# Patient Record
Sex: FEMALE | Race: WHITE | Employment: UNEMPLOYED | URBAN - METROPOLITAN AREA
[De-identification: names, ages, dates, MRNs, and addresses within clinical notes are randomized per-mention and may not be internally consistent; named-entity substitution may affect disease eponyms.]

---

## 2017-08-22 ENCOUNTER — HOSPITAL ENCOUNTER (OUTPATIENT)
Dept: OTHER | Age: 52
Discharge: HOME | End: 2017-08-22
Attending: INTERNAL MEDICINE | Admitting: INTERNAL MEDICINE

## 2017-08-22 ENCOUNTER — OFFICE VISIT (OUTPATIENT)
Dept: PULMONOLOGY | Age: 52
End: 2017-08-22

## 2017-08-22 VITALS
OXYGEN SATURATION: 97 % | BODY MASS INDEX: 32.58 KG/M2 | HEART RATE: 76 BPM | WEIGHT: 190.8 LBS | SYSTOLIC BLOOD PRESSURE: 104 MMHG | HEIGHT: 64 IN | TEMPERATURE: 98.1 F | DIASTOLIC BLOOD PRESSURE: 64 MMHG | RESPIRATION RATE: 16 BRPM

## 2017-08-22 DIAGNOSIS — R05.9 COUGH: ICD-10-CM

## 2017-08-22 DIAGNOSIS — R06.02 SOB (SHORTNESS OF BREATH): Primary | ICD-10-CM

## 2017-08-22 DIAGNOSIS — Z23 NEED FOR INFLUENZA VACCINATION: ICD-10-CM

## 2017-08-22 DIAGNOSIS — Z72.0 TOBACCO ABUSE: ICD-10-CM

## 2017-08-22 DIAGNOSIS — R06.02 SOB (SHORTNESS OF BREATH): ICD-10-CM

## 2017-08-22 DIAGNOSIS — Z23 NEED FOR PNEUMOCOCCAL VACCINATION: ICD-10-CM

## 2017-08-22 PROCEDURE — 90472 IMMUNIZATION ADMIN EACH ADD: CPT | Performed by: INTERNAL MEDICINE

## 2017-08-22 PROCEDURE — 90732 PPSV23 VACC 2 YRS+ SUBQ/IM: CPT | Performed by: INTERNAL MEDICINE

## 2017-08-22 PROCEDURE — 90688 IIV4 VACCINE SPLT 0.5 ML IM: CPT | Performed by: INTERNAL MEDICINE

## 2017-08-22 PROCEDURE — 90471 IMMUNIZATION ADMIN: CPT | Performed by: INTERNAL MEDICINE

## 2017-08-22 PROCEDURE — 99244 OFF/OP CNSLTJ NEW/EST MOD 40: CPT | Performed by: INTERNAL MEDICINE

## 2017-08-22 RX ORDER — LORATADINE 10 MG/1
10 TABLET ORAL DAILY
Refills: 1 | COMMUNITY
Start: 2017-07-24 | End: 2022-07-26

## 2017-08-22 RX ORDER — ALBUTEROL SULFATE 2.5 MG/3ML
SOLUTION RESPIRATORY (INHALATION)
COMMUNITY
Start: 2017-07-27

## 2017-08-22 RX ORDER — CITALOPRAM 20 MG/1
TABLET ORAL DAILY
COMMUNITY
Start: 2017-08-03 | End: 2022-07-26

## 2017-08-22 RX ORDER — ROPINIROLE 0.5 MG/1
0.5 TABLET, FILM COATED ORAL NIGHTLY
COMMUNITY
Start: 2017-08-03

## 2017-08-22 RX ORDER — FLUTICASONE PROPIONATE 50 MCG
1 SPRAY, SUSPENSION (ML) NASAL DAILY
Qty: 1 BOTTLE | Refills: 3 | Status: SHIPPED | OUTPATIENT
Start: 2017-08-22

## 2017-08-22 RX ORDER — CLONAZEPAM 0.5 MG/1
0.5 TABLET ORAL NIGHTLY
COMMUNITY
Start: 2017-08-03

## 2017-08-22 RX ORDER — FUROSEMIDE 20 MG/1
20 TABLET ORAL DAILY
COMMUNITY
Start: 2017-08-03

## 2017-08-22 RX ORDER — PRAVASTATIN SODIUM 40 MG
40 TABLET ORAL NIGHTLY
COMMUNITY
Start: 2017-08-21

## 2017-08-22 RX ORDER — QUETIAPINE FUMARATE 100 MG/1
100 TABLET, FILM COATED ORAL 2 TIMES DAILY
COMMUNITY
Start: 2017-07-27

## 2017-08-22 RX ORDER — IBUPROFEN 800 MG/1
TABLET ORAL
Status: ON HOLD | COMMUNITY
Start: 2017-08-21 | End: 2018-05-16 | Stop reason: HOSPADM

## 2017-08-22 RX ORDER — BACLOFEN 20 MG/1
20 TABLET ORAL 2 TIMES DAILY
Refills: 5 | COMMUNITY
Start: 2017-06-01

## 2017-08-22 RX ORDER — TRAMADOL HYDROCHLORIDE 50 MG/1
TABLET ORAL
COMMUNITY
Start: 2017-08-03 | End: 2017-10-30 | Stop reason: HOSPADM

## 2017-08-22 RX ORDER — OMEPRAZOLE 40 MG/1
40 CAPSULE, DELAYED RELEASE ORAL DAILY
COMMUNITY
Start: 2017-08-21

## 2017-08-22 RX ORDER — GABAPENTIN 600 MG/1
600 TABLET ORAL 2 TIMES DAILY
Refills: 0 | COMMUNITY
Start: 2017-07-24

## 2017-08-22 RX ORDER — MELOXICAM 15 MG/1
TABLET ORAL
COMMUNITY
Start: 2017-08-03 | End: 2017-10-26 | Stop reason: ALTCHOICE

## 2017-08-22 RX ORDER — METHOCARBAMOL 750 MG/1
TABLET, FILM COATED ORAL
COMMUNITY
Start: 2017-08-21 | End: 2018-05-03

## 2017-08-22 RX ORDER — POTASSIUM CHLORIDE 750 MG/1
10 TABLET, EXTENDED RELEASE ORAL DAILY
Refills: 1 | COMMUNITY
Start: 2017-06-22

## 2017-08-22 RX ORDER — BUDESONIDE AND FORMOTEROL FUMARATE DIHYDRATE 160; 4.5 UG/1; UG/1
AEROSOL RESPIRATORY (INHALATION)
COMMUNITY
Start: 2017-08-21 | End: 2018-05-03

## 2017-08-22 RX ORDER — VENLAFAXINE HYDROCHLORIDE 75 MG/1
CAPSULE, EXTENDED RELEASE ORAL DAILY
Refills: 5 | COMMUNITY
Start: 2017-06-08

## 2017-09-14 ENCOUNTER — HOSPITAL ENCOUNTER (OUTPATIENT)
Dept: PULMONOLOGY | Age: 52
Discharge: HOME | End: 2017-09-14
Attending: INTERNAL MEDICINE | Admitting: INTERNAL MEDICINE

## 2017-09-14 ENCOUNTER — OFFICE VISIT (OUTPATIENT)
Dept: PULMONOLOGY | Age: 52
End: 2017-09-14

## 2017-09-14 VITALS
OXYGEN SATURATION: 96 % | HEART RATE: 73 BPM | HEIGHT: 64 IN | WEIGHT: 186 LBS | BODY MASS INDEX: 31.76 KG/M2 | DIASTOLIC BLOOD PRESSURE: 72 MMHG | SYSTOLIC BLOOD PRESSURE: 106 MMHG | TEMPERATURE: 97.7 F | RESPIRATION RATE: 16 BRPM

## 2017-09-14 DIAGNOSIS — R06.02 SOB (SHORTNESS OF BREATH): ICD-10-CM

## 2017-09-14 DIAGNOSIS — Z72.0 TOBACCO ABUSE: ICD-10-CM

## 2017-09-14 DIAGNOSIS — R07.89 CHEST PRESSURE: Primary | ICD-10-CM

## 2017-09-14 DIAGNOSIS — R06.02 SHORTNESS OF BREATH: ICD-10-CM

## 2017-09-14 DIAGNOSIS — R05.9 COUGH: ICD-10-CM

## 2017-09-14 PROCEDURE — 99214 OFFICE O/P EST MOD 30 MIN: CPT | Performed by: INTERNAL MEDICINE

## 2017-09-14 RX ORDER — ALBUTEROL SULFATE 2.5 MG/3ML
2.5 SOLUTION RESPIRATORY (INHALATION) ONCE
Status: COMPLETED | OUTPATIENT
Start: 2017-09-14 | End: 2017-09-14

## 2017-09-14 RX ADMIN — ALBUTEROL SULFATE 2.5 MG: 2.5 SOLUTION RESPIRATORY (INHALATION) at 10:39

## 2017-09-21 ENCOUNTER — OFFICE VISIT (OUTPATIENT)
Dept: ORTHOPEDIC SURGERY | Age: 52
End: 2017-09-21

## 2017-09-21 VITALS — HEIGHT: 64 IN | WEIGHT: 186.07 LBS | BODY MASS INDEX: 31.77 KG/M2

## 2017-09-21 DIAGNOSIS — M23.204 DEGENERATIVE TEAR OF MEDIAL MENISCUS OF LEFT KNEE: ICD-10-CM

## 2017-09-21 DIAGNOSIS — G89.29 CHRONIC PAIN OF LEFT KNEE: ICD-10-CM

## 2017-09-21 DIAGNOSIS — M17.12 PRIMARY OSTEOARTHRITIS OF LEFT KNEE: Primary | ICD-10-CM

## 2017-09-21 DIAGNOSIS — M25.562 CHRONIC PAIN OF LEFT KNEE: ICD-10-CM

## 2017-09-21 PROCEDURE — 99243 OFF/OP CNSLTJ NEW/EST LOW 30: CPT | Performed by: ORTHOPAEDIC SURGERY

## 2017-10-11 ENCOUNTER — OFFICE VISIT (OUTPATIENT)
Dept: ORTHOPEDIC SURGERY | Age: 52
End: 2017-10-11

## 2017-10-11 DIAGNOSIS — M23.204 DEGENERATIVE TEAR OF MEDIAL MENISCUS OF LEFT KNEE: ICD-10-CM

## 2017-10-11 DIAGNOSIS — M25.562 CHRONIC PAIN OF LEFT KNEE: ICD-10-CM

## 2017-10-11 DIAGNOSIS — G89.29 CHRONIC PAIN OF LEFT KNEE: ICD-10-CM

## 2017-10-11 DIAGNOSIS — M17.12 PRIMARY OSTEOARTHRITIS OF LEFT KNEE: Primary | ICD-10-CM

## 2017-10-11 DIAGNOSIS — M25.551 PAIN OF RIGHT HIP JOINT: ICD-10-CM

## 2017-10-11 PROCEDURE — 99213 OFFICE O/P EST LOW 20 MIN: CPT | Performed by: ORTHOPAEDIC SURGERY

## 2017-10-11 PROCEDURE — 73502 X-RAY EXAM HIP UNI 2-3 VIEWS: CPT | Performed by: ORTHOPAEDIC SURGERY

## 2017-10-11 NOTE — PROGRESS NOTES
KNEE VISIT      HISTORY OF PRESENT ILLNESS    Abhilash Hairston is a 45 y.o. female who presents for reevaluation of her left knee after her MRI. She additionally fell recently landing on her right buttock and now has some posterior hip pain. She did not have any radicular symptoms in the right side. She does note persistent having discomfort in the left knee. She says is closer to fall several times. ROS    Well-documented in the patient history form dated 9/21/17  All other ROS negative except for above. Past Surgical history    History reviewed. No pertinent surgical history.     PAST MEDICAL    Past Medical History:   Diagnosis Date    COPD (chronic obstructive pulmonary disease) (HCC)     GERD (gastroesophageal reflux disease)     Hiatal hernia    Hyperlipidemia     Primary osteoarthritis of left knee 9/21/2017    Tobacco abuse 8/22/2017    Type 2 diabetes mellitus without complication (HCC)        Allergies    No Known Allergies    Meds    Current Outpatient Prescriptions   Medication Sig Dispense Refill    PROAIR  (90 Base) MCG/ACT inhaler Inhale 2 puffs into the lungs every 6 hours as needed for Wheezing or Shortness of Breath 1 Inhaler 5    baclofen (LIORESAL) 20 MG tablet   5    omeprazole (PRILOSEC) 40 MG delayed release capsule       QUEtiapine (SEROQUEL) 100 MG tablet       citalopram (CELEXA) 20 MG tablet       meloxicam (MOBIC) 15 MG tablet       methocarbamol (ROBAXIN) 750 MG tablet       gabapentin (NEURONTIN) 600 MG tablet   0    albuterol (PROVENTIL) (2.5 MG/3ML) 0.083% nebulizer solution       clonazePAM (KLONOPIN) 0.5 MG tablet       SYMBICORT 160-4.5 MCG/ACT AERO       furosemide (LASIX) 20 MG tablet       ibuprofen (ADVIL;MOTRIN) 800 MG tablet       loratadine (CLARITIN) 10 MG tablet   1    potassium chloride (KLOR-CON M) 10 MEQ extended release tablet   1    pravastatin (PRAVACHOL) 40 MG tablet       rOPINIRole (REQUIP) 0.5 MG tablet       traMADol tenderness [x] [] [x] []     Additional Exam Comments:  Her neurocirculatory and lymphatic exam appears stable symmetric and relatively normal to both lower extremities. She does have generalized pain and some crepitus in the left knee with stress and range of motion and deep palpation along the joint line, particularly along the medial joint line. She has no gross instability. She has some pain around posterior aspect right hip. No swelling, crepitus, or bruising. She can walk well. Neurologically she is intact and symmetric to both lower extremities. Her gait is somewhat abnormal.      IMAGING STUDIES    MRI of her left knee reveals an insufficiency fracture, lateral tibial plateau as well as lateral meniscus tear. It looks like she probably also has a posterior 3rd medial meniscus tear with Baker cyst.      X-rays 2 views of the right hip are unremarkable for acute or chronic pathology    IMPRESSION    Left knee pain, chronic, secondary to osteoarthritis and degenerative medial meniscus and lateral meniscus tears with insufficiency fracture of the lateral tibial plateau  Right acute hip pain secondary to contusion    PLAN      1. Conservative care options including physical therapy, NSAIDs, bracing, and activity modification were discussed. 2.  The indications for therapeutic injections were discussed. 3.  The indications for additional imaging studies were discussed. 4.  After considering the various options discussed, the patient elected to pursue a course that includes proceed with arthroscopic intervention to her left knee to include internal fixation of her insufficiency fracture with bone substitute. INFORMED CONSENT NOTE        We discussed the risks, benefits, and alternatives to the proposed procedure, as well as the necessity of other members of the healthcare team participating in the procedure.  All questions were answered and the patient elected to proceed with the proposed procedure and signed the informed consent form.

## 2017-10-12 PROBLEM — M23.301 DEGENERATIVE TEAR OF LATERAL MENISCUS OF LEFT KNEE: Status: ACTIVE | Noted: 2017-10-12

## 2017-10-13 NOTE — LETTER
Meet Nogueira. Marramandwight Gallardojosé antonio        Patient:  Mirian Frazier             Surgery Location:    [facility]        Surgery Time & Date:   10/3017  @ 11:00 am    ARRIVE at the surgery center by:  9:00 am        INSTRUCTIONS FOR SURGERY    You must have medical clearance before your surgery. Call your family physician to schedule an appointment for a physical 2-3 weeks before your surgery and take the attached history and physical form to your family physician. It will need to be completed by your family physician and faxed to the appropriate number. If not completed your surgery will be cancelled. Physicals are only good for 30 days. You may require some pre-surgical lab work. Check with your family physician. A nurse from the hospital will call you to discuss your medical history. If you see a cardiologist you must have cardiac clearance before your surgery. Please make an appointment with your cardiologist for clearance. ? You MAY NOT eat or drink anything after midnight the evening before you surgery. This includes chewing gum and all beverages, including coffee. ? Arrange for someone to drive you home after surgery. ? YOU WILL NOT BE PERMITTED TO DRIVE AFTER YOUR SURGERY. Stop taking any blood thinners such as Aspirin, Warfarin, Coumadin, Plavix or Ibuprofen seven days prior to surgery. If you are taking other prescription medications please call the prescribing physician. ? Do not smoke or use any tobacco products after midnight the night before your surgery. ? Do not drink alcohol for 24 hours before or after your surgery    ? You may brush your teeth or wash your mouth provided nothing is swallowed. ? All jewelry should be removed prior to surgery. It is best to leave all jewelry/valuables at home. ?  Choose loose comfortable clothing which is easy to put on, take off and that can be easily folded. Sleeves and pant legs should be loose enough to fit over bandages if necessary. ? Remove all make-up and your contact lenses, if you wear them. ? If applicable bring a protective case for contact lenses/eyeglasses, dentures and hearing aids. Insurance Information:     ? Our precert department will get authorization for your surgery if one is required. ? Please make sure we are notified of any insurance changes prior to your surgery. ? It is YOUR responsibility to know what your benefits are and whether a co-pay is required. It is recommended that you verify your benefits with your insurance company prior to surgery. Frankie Mcgee           YOUR APPOINTMENT FOLLOWING SURGERY WILL BE:                             [facility] OFFICE        _XX____KISHORE            FOR     11/8/17  @ 1:40 AM                                      PHYSICAL THERAPY APPT:_________Kishore Norris____    *If you have any questions please call Christiano Issa [phone]

## 2017-10-13 NOTE — LETTER
[facility]  Surgery Precert & Billing Form:    DEMOGRAPHICS:                                                                                                       Patient Name:  Anil Wood  Patient :  1965   Patient SS#:      Patient Phone:  [phone] (home)  Alt. Patient Phone:    Patient Address:  [address] [place] [address]    PCP:  Nicolle Espinal MD  Insurance: [employer]    DIAGNOSIS & PROCEDURE:                                                                                      Diagnosis: M84.469G, M23.301  Operation: left    SURGERY  INFORMATION  Date of Surgery:   10/30/17  Location:    [facility]  Type:    Outpatient  23 hour hold:  No  Surgeon:       Deb Andino.  Zee Reyes MD   10/13/17     BILLING INFORMATION:                                                                                                Physician Procedure                                            CPT Codes                      PA, or Fellow Procedure                                      CPT Codes                      Precert information:    SX 10/30/17 [facility]  Auth # [id number]

## 2017-10-13 NOTE — LETTER
Surgery Date: 10/30/17  Surgery Time: 11:00 AM Arrival Time: 9:00 AM      Sean Guillen Surgery Scheduling    (Fax to [phone])    1. Patient Name: Rustam Combs    2. Patient Type:  Outpatient  Admit after    Impatient       Post-procedure    3. Surgeon: Eloy Rivera. Lit Kirkland MD      Assisting MD:     4. Procedure: LT KNEE VA LATERAL MENISECTOMY CHONDROPLASTY, INTERNAL FIXATION LATERAL TIBIAL PLATEAU INSUFFICIENCY FX W/BONE SUBSTITUTE      5. LATEX ALLERGY:  YES  NO   Previous Surgery Date (if applicable):     6. Anesthesia Type:  General/BLOCK Spinal/Block    Epidural  MAC  Local             None     7. Amount of time requested for procedure: 45 MIN   (Actual time may differ according to surgeon's average time in computer)      8. Equipment Needs:  C-Arm    Laser     Ultra Sound    Phaco   Microscope   Ureteroscope   Needle Loc   Other:   KNEE IMMOBILIZER      9. Special Needs for the case: [facility]           Name of company/rep to obtain special needs: [address] [place] [address]    10. Pre-Op Diagnosis: LT KNEE LMT, OA INSUFFICIENCY FX LATERAL TIBIAL PLATEAU    11. Any special admission testing other than routine:     12. Social Security Number:                             YOB: 1965    13. Address: [place] [address]    14. Phone Number: [phone] (home)          * Alternate person to contact if unable to reach Patient *   _______________________________________  [address]: Payor: [facility] / Plan: Yessi Noel / Product Type: *No Product type* /       Scheduled By: Diane Winters 10/13/2017              Hospital Use Only:  Information entered on: _____________________ By: ______________________________________            Aly Mead TO [facility]  [phone]  IN ACCORDANCE WITH OUR FORMULARY SYSTEM, A GENERIC EQUIVALENT DRUG MAY BE DISPENSED AND ADMINISTERED UNLESS \"D. A. W. \" IS WRITTEN WIT HTHE MEDICATION ORDER

## 2017-10-23 ENCOUNTER — TELEPHONE (OUTPATIENT)
Dept: ORTHOPEDIC SURGERY | Age: 52
End: 2017-10-23

## 2017-10-23 NOTE — TELEPHONE ENCOUNTER
CPT [id number], [id number], [id number], [id number] [facility] # [id number]    Date 10-30-17   knee

## 2017-10-26 DIAGNOSIS — M17.12 OSTEOARTHRITIS OF LEFT KNEE, UNSPECIFIED OSTEOARTHRITIS TYPE: Primary | ICD-10-CM

## 2017-10-26 NOTE — PRE-PROCEDURE INSTRUCTIONS
you have a Living Will and Durable Power of  for Healthcare, please bring in a copy. 13.  Notify your Surgeon if you develop any illness between now and surgery time; cough, cold, fever, sore throat, nausea, vomiting, etc.  Please notify your surgeon if you experience dizziness, shortness of breath or blurred vision between now and the time of your surgery. 16.  DO NOT shave your operative site 96 hours (4 days) prior to surgery. For face and neck surgery, men may use an electric razor 48 hours (2 days) prior to surgery. 17. Shower the night before surgery and the morning of surgery with  an antibacterial soap   or  Chlorhexidine gluconate (for total joint replacement). To provide excellent care, visitors will be limited to two in a room at any given time. Please no children under the age of 15 in the surgical department.

## 2017-10-27 NOTE — ANESTHESIA PRE-OP
'light-for-dates' infant with signs of fetal malnutrition P05.00       Past Medical History:        Diagnosis Date    COPD (chronic obstructive pulmonary disease) ([facility] [address]     GERD (gastroesophageal reflux disease)     Hiatal hernia    Hyperlipidemia     Primary osteoarthritis of left knee 9/21/2017    Tobacco abuse 8/22/2017    Type 2 diabetes mellitus without complication ([facility] [address]        Past Surgical History:        Procedure Laterality Date    KNEE ARTHROSCOPY Left 2013       Social History:    Social History   Substance Use Topics    Smoking status: Current Every Day Smoker     Packs/day: 0.50     Years: 5.00     Types: Cigarettes    Smokeless tobacco: Never Used      Comment: down from 2ppd    Alcohol use No                                Ready to quit: Not Answered  Counseling given: Not Answered      Vital Signs (Current):   Vitals:    10/26/17 1048   Weight: 160 lb (72.6 kg)   Height: 5' 4\" (1.626 m)                                              BP Readings from Last 3 Encounters:   09/14/17 106/72   08/22/17 104/64       NPO Status:                                                                                 BMI:   Wt Readings from Last 3 Encounters:   10/26/17 160 lb (72.6 kg)   09/21/17 186 lb 1.1 oz (84.4 kg)   09/14/17 186 lb (84.4 kg)     Body mass index is 27.46 kg/m².     Anesthesia Evaluation  Patient summary reviewed and Nursing notes reviewed no history of anesthetic complications:   Airway: Mallampati: II  TM distance: >3 FB   Neck ROM: full  Mouth opening: > = 3 FB Dental:          Pulmonary:   (+) COPD:                             Cardiovascular:    (+) hyperlipidemia                  Neuro/Psych:   {neg ROS              GI/Hepatic/Renal:   (+) GERD:,           Endo/Other:    (+) Type II DM, ,                  Abdominal:           Vascular:                                      Anesthesia Plan      general     ASA 1    (45year-old patient presents for left knee arthroscopy, chondroplasty, loose body removal, and internal fixation of medial tibial plateau  insufficiency fracture with bone substitute. Plan general anesthesia with ASA standard monitors plus femoral and/or sciatic nerve blocks for postoperative analgesia. Questions answered.   Patient agreeable with anesthetic plan.  )                      Huma Lamas MD   10/27/2017

## 2017-10-30 ENCOUNTER — HOSPITAL ENCOUNTER (OUTPATIENT)
Dept: SURGERY | Age: 52
Discharge: HOME | End: 2017-10-30
Attending: ORTHOPAEDIC SURGERY | Admitting: ORTHOPAEDIC SURGERY

## 2017-10-30 VITALS
DIASTOLIC BLOOD PRESSURE: 63 MMHG | SYSTOLIC BLOOD PRESSURE: 95 MMHG | WEIGHT: 160 LBS | HEIGHT: 64 IN | OXYGEN SATURATION: 95 % | BODY MASS INDEX: 27.31 KG/M2 | TEMPERATURE: 97 F | RESPIRATION RATE: 13 BRPM | HEART RATE: 82 BPM

## 2017-10-30 DIAGNOSIS — M25.569 KNEE PAIN, UNSPECIFIED CHRONICITY, UNSPECIFIED LATERALITY: ICD-10-CM

## 2017-10-30 LAB
ANION GAP SERPL CALCULATED.3IONS-SCNC: 13 MMOL/L (ref 3–16)
BUN BLDV-MCNC: 10 MG/DL (ref 7–20)
CALCIUM SERPL-MCNC: 9.5 MG/DL (ref 8.3–10.6)
CHLORIDE BLD-SCNC: 99 MMOL/L (ref 99–110)
CO2: 28 MMOL/L (ref 21–32)
CREAT SERPL-MCNC: 0.6 MG/DL (ref 0.6–1.1)
GFR AFRICAN AMERICAN: >60
GFR NON-AFRICAN AMERICAN: >60
GLUCOSE BLD-MCNC: 106 MG/DL (ref 70–99)
GLUCOSE BLD-MCNC: 99 MG/DL (ref 70–99)
PERFORMED ON: ABNORMAL
POTASSIUM SERPL-SCNC: 3.9 MMOL/L (ref 3.5–5.1)
SODIUM BLD-SCNC: 140 MMOL/L (ref 136–145)

## 2017-10-30 PROCEDURE — 93010 ELECTROCARDIOGRAM REPORT: CPT | Performed by: INTERNAL MEDICINE

## 2017-10-30 RX ORDER — LIDOCAINE HYDROCHLORIDE 10 MG/ML
0.3 INJECTION, SOLUTION EPIDURAL; INFILTRATION; INTRACAUDAL; PERINEURAL
Status: COMPLETED | OUTPATIENT
Start: 2017-10-30 | End: 2017-10-30

## 2017-10-30 RX ORDER — ACETAMINOPHEN 10 MG/ML
1000 INJECTION, SOLUTION INTRAVENOUS ONCE
Status: COMPLETED | OUTPATIENT
Start: 2017-10-30 | End: 2017-10-30

## 2017-10-30 RX ORDER — MORPHINE SULFATE 10 MG/ML
1 INJECTION, SOLUTION INTRAMUSCULAR; INTRAVENOUS EVERY 5 MIN PRN
Status: DISCONTINUED | OUTPATIENT
Start: 2017-10-30 | End: 2017-10-31 | Stop reason: HOSPADM

## 2017-10-30 RX ORDER — SODIUM CHLORIDE 0.9 % (FLUSH) 0.9 %
10 SYRINGE (ML) INJECTION PRN
Status: DISCONTINUED | OUTPATIENT
Start: 2017-10-30 | End: 2017-10-31 | Stop reason: HOSPADM

## 2017-10-30 RX ORDER — LABETALOL HYDROCHLORIDE 5 MG/ML
5 INJECTION, SOLUTION INTRAVENOUS EVERY 10 MIN PRN
Status: DISCONTINUED | OUTPATIENT
Start: 2017-10-30 | End: 2017-10-31 | Stop reason: HOSPADM

## 2017-10-30 RX ORDER — DIPHENHYDRAMINE HYDROCHLORIDE 50 MG/ML
12.5 INJECTION INTRAMUSCULAR; INTRAVENOUS
Status: ACTIVE | OUTPATIENT
Start: 2017-10-30 | End: 2017-10-30

## 2017-10-30 RX ORDER — SODIUM CHLORIDE, SODIUM LACTATE, POTASSIUM CHLORIDE, CALCIUM CHLORIDE 600; 310; 30; 20 MG/100ML; MG/100ML; MG/100ML; MG/100ML
INJECTION, SOLUTION INTRAVENOUS CONTINUOUS
Status: DISCONTINUED | OUTPATIENT
Start: 2017-10-30 | End: 2017-10-31 | Stop reason: HOSPADM

## 2017-10-30 RX ORDER — OXYCODONE HYDROCHLORIDE 5 MG/1
5 TABLET ORAL PRN
Status: COMPLETED | OUTPATIENT
Start: 2017-10-30 | End: 2017-10-30

## 2017-10-30 RX ORDER — PROMETHAZINE HYDROCHLORIDE 25 MG/ML
6.25 INJECTION, SOLUTION INTRAMUSCULAR; INTRAVENOUS
Status: COMPLETED | OUTPATIENT
Start: 2017-10-30 | End: 2017-10-30

## 2017-10-30 RX ORDER — HYDRALAZINE HYDROCHLORIDE 20 MG/ML
5 INJECTION INTRAMUSCULAR; INTRAVENOUS EVERY 10 MIN PRN
Status: DISCONTINUED | OUTPATIENT
Start: 2017-10-30 | End: 2017-10-31 | Stop reason: HOSPADM

## 2017-10-30 RX ORDER — MEPERIDINE HYDROCHLORIDE 25 MG/ML
12.5 INJECTION INTRAMUSCULAR; INTRAVENOUS; SUBCUTANEOUS EVERY 5 MIN PRN
Status: DISCONTINUED | OUTPATIENT
Start: 2017-10-30 | End: 2017-10-31 | Stop reason: HOSPADM

## 2017-10-30 RX ORDER — OXYCODONE HYDROCHLORIDE 5 MG/1
10 TABLET ORAL PRN
Status: COMPLETED | OUTPATIENT
Start: 2017-10-30 | End: 2017-10-30

## 2017-10-30 RX ORDER — METOCLOPRAMIDE HYDROCHLORIDE 5 MG/ML
10 INJECTION INTRAMUSCULAR; INTRAVENOUS
Status: ACTIVE | OUTPATIENT
Start: 2017-10-30 | End: 2017-10-30

## 2017-10-30 RX ORDER — HYDROCODONE BITARTRATE AND ACETAMINOPHEN 7.5; 325 MG/1; MG/1
1 TABLET ORAL EVERY 6 HOURS PRN
Qty: 28 TABLET | Refills: 0 | Status: SHIPPED | OUTPATIENT
Start: 2017-10-30 | End: 2017-11-08 | Stop reason: SDUPTHER

## 2017-10-30 RX ORDER — SODIUM CHLORIDE 0.9 % (FLUSH) 0.9 %
10 SYRINGE (ML) INJECTION EVERY 12 HOURS SCHEDULED
Status: DISCONTINUED | OUTPATIENT
Start: 2017-10-30 | End: 2017-10-31 | Stop reason: HOSPADM

## 2017-10-30 RX ADMIN — ACETAMINOPHEN 1000 MG: 10 INJECTION, SOLUTION INTRAVENOUS at 09:48

## 2017-10-30 RX ADMIN — LIDOCAINE HYDROCHLORIDE 0.1 ML: 10 INJECTION, SOLUTION EPIDURAL; INFILTRATION; INTRACAUDAL; PERINEURAL at 09:48

## 2017-10-30 RX ADMIN — OXYCODONE HYDROCHLORIDE 10 MG: 5 TABLET ORAL at 12:25

## 2017-10-30 RX ADMIN — Medication 0.5 MG: at 11:36

## 2017-10-30 RX ADMIN — PROMETHAZINE HYDROCHLORIDE 6.25 MG: 25 INJECTION, SOLUTION INTRAMUSCULAR; INTRAVENOUS at 11:36

## 2017-10-30 ASSESSMENT — PAIN DESCRIPTION - DESCRIPTORS
DESCRIPTORS: ACHING;DISCOMFORT
DESCRIPTORS: DISCOMFORT

## 2017-10-30 ASSESSMENT — PAIN SCALES - GENERAL
PAINLEVEL_OUTOF10: 6
PAINLEVEL_OUTOF10: 2
PAINLEVEL_OUTOF10: 8
PAINLEVEL_OUTOF10: 4

## 2017-10-30 ASSESSMENT — PAIN - FUNCTIONAL ASSESSMENT: PAIN_FUNCTIONAL_ASSESSMENT: 0-10

## 2017-10-30 ASSESSMENT — PAIN DESCRIPTION - LOCATION: LOCATION: KNEE

## 2017-10-30 ASSESSMENT — PAIN DESCRIPTION - ORIENTATION: ORIENTATION: LEFT

## 2017-10-30 ASSESSMENT — PAIN DESCRIPTION - FREQUENCY: FREQUENCY: INTERMITTENT

## 2017-10-30 ASSESSMENT — PAIN DESCRIPTION - PAIN TYPE: TYPE: SURGICAL PAIN

## 2017-10-30 NOTE — PROGRESS NOTES
Pt is alert and oriented, she requested and will receive oxy x2 tabs for pain rate of 6.  Family updated, pt is now on room air

## 2017-10-30 NOTE — PROGRESS NOTES
Assessment unchanged. States pain is easing. Frequent reminders to patient and family that patient is not to get up by self for 24 hours. Patient discharged to home with daughters via wheelchair.

## 2017-10-30 NOTE — OP NOTE
[address]                [address] [place] [address]                               OPERATIVE REPORT    PATIENT NAME: López Roman                       :             1965  MED REC NO:   [id number]                           ROOM:  ACCOUNT NO:   [de-identified]                           ADMISSION DATE:  10/30/2017  PROVIDER:     Coty Gonzalez MD    DATE OF PROCEDURE:  10/30/2017    PREOPERATIVE DIAGNOSIS:  Left knee lateral meniscus tear,  osteoarthritis, and insufficiency fracture of lateral tibial plateau. POSTOPERATIVE DIAGNOSIS:  Left knee lateral meniscus tear,  osteoarthritis, and insufficiency fracture of lateral tibial plateau. OPERATION PERFORMED:  Left knee video arthroscopy with partial lateral  meniscectomy and internal fixation of lateral tibial plateau  insufficiency fracture with bone substitute under fluoroscopic  guidance. SURGEON:  Coty Gonzalez M.D. ANESTHESIA:  General, leg block. BLOOD LOSS:  Less than 5 cc. COMPLICATIONS:  None noted. INDICATIONS:  The patient is a 68-year-old female with a history of  left knee pain confirmed to be lateral meniscus tear, osteoarthritis,  and insufficiency fracture of the lateral tibial plateau as based on  MRI findings. After failure of other modalities, she elected for the  recommendations of surgical intervention. OPERATIVE PROCEDURE:  The patient was taken to the operating room,  where general anesthetic was obtained. A leg block had already been  performed. Her leg was sterilely prepped and draped and a two-portal  arthroscopy of the right knee was carried out in usual fashion using a  30-degree arthroscope. Upon entry to the knee, she was noted to have  a complex tear to the middle half of the lateral meniscus and a  partial lateral meniscectomy was performed leaving a well-contoured  and balanced inner border.   She had some focal grade 4 changes of  osteoarthritis in the posterolateral tibial plateau region. The ACL  and PCL, and medial compartment were otherwise free of pathology. The  patellofemoral joint tracked normally. The knee was then thoroughly  irrigated and evacuated of all loose debris and instilled with 20 mL  of 0.25% Marcaine plain. The port was repaired with 4-0 nylon in a  figure-of-eight fashion. Based on preoperative review of this patients left knee MRI, the  location of the bone marrow lesion, consistent with an insufficiency  or stress fracture, in the lateral tibial plateau was identified. Preoperative surgical planning allowed for determination of the  optimal method for accessing the lesion. Intraoperatively, image  fluoroscopy combined with bone targeting instruments from [employer] were used to guide surgical instruments into the proximity  of the subchondral lateral tibial plateau fracture. Specifically, the  [employer] Accuport injection cannula was placed in the  subchondral bone. Standard repair methodology was used to treat the  subchondral bone defect in the lateral tibial plateau. Image  fluoroscopy was utilized to confirm accurate insertion of the Accuport  injection cannula into the subchondral bone. After insertion,  fracture stabilization was performed by injecting 4 mL of [employer] Accufill bone substitute into the lateral tibial plateau. Image fluoroscopy was used to monitor the injection process and insure  injection of the bone substitute into the subchondral bone so that  following polymerization, the bone substitute stabilized the fracture  and facilitated fracture repair. The injection wounds were closed and  sterile dressing was applied.         Shaun Villalba MD    D: 10/30/2017 11:16:40       T: 10/30/2017 12:19:15     [email or web address]  Job#: [id number]     Doc#: [id number]

## 2017-10-30 NOTE — PLAN OF CARE
.Pre-Operative:  1. Patient/Caregiver identifies - states name and date of birth. 2.  The patient is free from signs and symptoms of injury. 3.  The patient receives appropriate medication(s), safely administered during the Perioperative period. 4.  The patient is free from signs and symptoms of infection. 5.  The patient has wound / tissue perfusion. 6.  The patients's fluid, electrolyte, and acid-base balances are established preoperatively. 7.  The patient's pulmonary function is established preoperatively. 8.  The patient's cardiovascular status is established preoperatively. 9.  The patient / caregiver demonstrates knowledge of nutritional management related to the operative or other invasive procedure. 10. The patient/caregiver demonstrates knowledge of medication management. 11. The patient/caregiver demonstrates knowledge of pain management. 12.  The patient participates in the rehabilitation process as applicable. 13.  The patient/caregiver participates in decisions affection his or her Perioperative plan of care. 14.  The patient's care is consistent with the individualized Perioperative plan of care. 15.  The patient's right to privacy is maintained. 16.  The patient is the recipient of competent and ethical care within legal standards of practice. 17.  The patient's value system, lifestyle, ethnicity, and culture are considered, respected, and incorporated in the Perioperative plan of care and understands special services available. 18.  The patient demonstrates and/or reports adequate pain control throughout the the Perioperative period. 19. The patient's neurological status is established preoperatively. 20. The patient/caregiver demonstrates knowledge of the expected responses to the operative or invasive procedure. 21.  Patient/Caregiver has reduced anxiety. Interventions- Familiarize with environment and equipment.   22. Patient/Caregiver verbalizes understanding of Phase I and Phase II process. 23.  Patient pain level is established preoperatively using age appropriate pain scale. 24.  The patient will move to fall risk upon sedation- during and through the recovery phase. Interventions- orient the patient to the environment, especially the location of the bathroom; provide treaded socks/non-skid footwear; demonstrate and teach back use of the nurse's call system; instruct the patient to call for help before getting out of bed; lock all movable equipment before transferring patient; keep bed in lowest position possible.  25.  Other:

## 2017-10-30 NOTE — PROCEDURES
Left Ultrasound-Guided Femoral Nerve Block    Indication: Postoperative analgesia upon surgeon request.    Procedure: Patient supine. Sterile prep. 22G 80mm insulated regional block needle inserted lateral to left femoral nerve and directed medially toward nerve under direct ultrasound visualization. Once the needle tip was visualized to have entered the nerve sheath, Bupivacaine 0.5% was injected in 5cc increments to 30cc total; negative aspiration for heme prior to each injection. No apparent complications. Block tolerated well. Clementina Green.  Nav Nguyen MD  October 30, 2017 10:46 AM

## 2017-10-30 NOTE — PROGRESS NOTES
Time out completed with Dr. SHOEMAKER at bedside. Single femoral block done per Dr. SHOEMAKER. Patient tolerated well.

## 2017-10-31 LAB
EKG ATRIAL RATE: 78 BPM
EKG DIAGNOSIS: NORMAL
EKG P AXIS: 60 DEGREES
EKG P-R INTERVAL: 130 MS
EKG Q-T INTERVAL: 384 MS
EKG QRS DURATION: 80 MS
EKG QTC CALCULATION (BAZETT): 437 MS
EKG R AXIS: -13 DEGREES
EKG T AXIS: 56 DEGREES
EKG VENTRICULAR RATE: 78 BPM

## 2017-11-08 ENCOUNTER — OFFICE VISIT (OUTPATIENT)
Dept: ORTHOPEDIC SURGERY | Age: 52
End: 2017-11-08

## 2017-11-08 DIAGNOSIS — M25.562 CHRONIC PAIN OF LEFT KNEE: Primary | ICD-10-CM

## 2017-11-08 DIAGNOSIS — I82.4Z2 DEEP VEIN THROMBOSIS (DVT) OF DISTAL VEIN OF LEFT LOWER EXTREMITY, UNSPECIFIED CHRONICITY (HCC): ICD-10-CM

## 2017-11-08 DIAGNOSIS — G89.29 CHRONIC PAIN OF LEFT KNEE: Primary | ICD-10-CM

## 2017-11-08 PROCEDURE — 99024 POSTOP FOLLOW-UP VISIT: CPT | Performed by: ORTHOPAEDIC SURGERY

## 2017-11-08 PROCEDURE — 73560 X-RAY EXAM OF KNEE 1 OR 2: CPT | Performed by: ORTHOPAEDIC SURGERY

## 2017-11-08 RX ORDER — HYDROCODONE BITARTRATE AND ACETAMINOPHEN 7.5; 325 MG/1; MG/1
1 TABLET ORAL EVERY 6 HOURS PRN
Qty: 28 TABLET | Refills: 0 | Status: SHIPPED | OUTPATIENT
Start: 2017-11-08 | End: 2017-11-15

## 2017-11-08 NOTE — PROGRESS NOTES
FOLLOW-UP VISIT      The patient returns for follow-up s/p left knee lateral meniscectomy and internal fixation of the lateral tibial plateau insufficiency fracture with bone substitute    Date of Surgery    10/30/17    Wound Status    Sutures Removed, Incisions are healing well with no surrounding erythema, and minimal ecchymosis. Exam    She has a fairly swollen calf without ecchymoses or signs of infection. She has focal Homans, but no palpable organs. A lot of it may be dependency and sitting rather than elevating her legs but my concern is she may have DVT. She still persists in having pain but otherwise is walking without ambulatory aids, although I told her she should get back on her walker while she has swelling. Plan    Venous Doppler and refill pain medication    Follow-up Appointment    2 weeks    Patient being given increased dosage/quantity of opoid pain medication in excess of OSMB guidelines which noted a 30 MED daily of opioids due to the fact that he/she has undergone major orthopaedic surgery as outlined in rule 4731-11-13. Dosages and further duration of the pain medication will be re-evaluated at her post op visit in 2 weeks. Patient was educated on dosing expectations and limits of prescribing as a result of the new [facility] rules enacted August 31, 2017. Please also note that this is not the initial opoid prescription issued to this patient but a continuation of medication utilized during the hospital admission as noted in the medical record. [place]ARRS report has also been utilized to screen for any abuse history or suspicious activity as outlined in [place]. All efforts have been taken to prevent abuse potential and misuse of opioid medications including education, screening, and close clinical follow up.

## 2017-11-08 NOTE — PATIENT INSTRUCTIONS
floor. Hold for about 6 seconds, then slowly lower your leg. 5. Do 8 to 12 repetitions. Straight-leg raises to the back    1. Lie on your stomach, and lift your leg straight up behind you (toward the ceiling). 2. Lift your toes about 6 inches off the floor, hold for about 6 seconds, then lower slowly. 3. Do 8 to 12 repetitions. Straight-leg raises to the inside    1. Lie on the side of your body with the injured leg. 2. You can either prop your other (good) leg up on a chair, or you can bend your good knee and put that foot in front of your injured knee. Do not drop your hip back. 3. Tighten the muscles on the front of your thigh to straighten your injured knee. 4. Keep your kneecap pointing forward, and lift your whole leg up toward the ceiling about 6 inches. Hold for about 6 seconds, then lower slowly. 5. Do 8 to 12 repetitions. Heel dig bridging    1. Lie on your back with both knees bent and your ankles bent so that only your heels are digging into the floor. Your knees should be bent about 90 degrees. 2. Then push your heels into the floor, squeeze your buttocks, and lift your hips off the floor until your shoulders, hips, and knees are all in a straight line. 3. Hold for about 6 seconds as you continue to breathe normally, and then slowly lower your hips back down to the floor and rest for up to 10 seconds. 4. Do 8 to 12 repetitions. Hamstring curls    1. Lie on your stomach with your knees straight. If your kneecap is uncomfortable, roll up a washcloth and put it under your leg just above your kneecap. 2. Lift the foot of your injured leg by bending the knee so that you bring the foot up toward your buttock. If this motion hurts, try it without bending your knee quite as far. This may help you avoid any painful motion. 3. Slowly lower your leg back to the floor. 4. Do 8 to 12 repetitions.   5. With permission from your doctor or physical therapist, you may also want to add a cuff weight to your ankle (not more than 5 pounds). With weight, you do not have to lift your leg more than 12 inches to get a hamstring workout. Shallow standing knee bends    Note: Do this exercise only if you have very little pain; if you have no clicking, locking, or giving way if you have an injured knee; and if it does not hurt while you are doing 8 to 12 repetitions. 1. Stand with your hands lightly resting on a counter or chair in front of you. Put your feet shoulder-width apart. 2. Slowly bend your knees so that you squat down like you are going to sit in a chair. Make sure your knees do not go in front of your toes. 3. Lower yourself about 6 inches. Your heels should remain on the floor at all times. 4. Rise slowly to a standing position. Heel raises    1. Stand with your feet a few inches apart, with your hands lightly resting on a counter or chair in front of you. 2. Slowly raise your heels off the floor while keeping your knees straight. 3. Hold for about 6 seconds, then slowly lower your heels to the floor. 4. Do 8 to 12 repetitions several times during the day. Follow-up care is a key part of your treatment and safety. Be sure to make and go to all appointments, and call your doctor if you are having problems. It's also a good idea to know your test results and keep a list of the medicines you take. Where can you learn more? Go to https://inContactpeMarketforce One.[employer] (FIG). org and sign in to your [employer] account. Enter O438 in the [facility] box to learn more about \"Knee: Exercises. \"     If you do not have an account, please click on the \"Sign Up Now\" link. Current as of: March 21, 2017  Content Version: 11.3  © 9882-7984 [employer]. Care instructions adapted under license by [facility] ([facility]).  If you have questions about a medical condition or this instruction, always ask your healthcare professional. [place] [address] any warranty or liability for your use of this information.

## 2017-12-01 ENCOUNTER — OFFICE VISIT (OUTPATIENT)
Dept: ORTHOPEDIC SURGERY | Age: 52
End: 2017-12-01

## 2017-12-01 VITALS
WEIGHT: 150 LBS | BODY MASS INDEX: 25.61 KG/M2 | HEIGHT: 64 IN | DIASTOLIC BLOOD PRESSURE: 74 MMHG | HEART RATE: 73 BPM | SYSTOLIC BLOOD PRESSURE: 133 MMHG

## 2017-12-01 DIAGNOSIS — M17.12 PRIMARY OSTEOARTHRITIS OF LEFT KNEE: Primary | ICD-10-CM

## 2017-12-01 DIAGNOSIS — M23.204 DEGENERATIVE TEAR OF MEDIAL MENISCUS OF LEFT KNEE: ICD-10-CM

## 2017-12-01 PROCEDURE — 99024 POSTOP FOLLOW-UP VISIT: CPT | Performed by: ORTHOPAEDIC SURGERY

## 2017-12-01 NOTE — LETTER
ROBERTO CAMACHO [address]  Phone: [phone]  Fax: [phone]    Salo Nichole MD        2017    Maria Del Rosario Mckenzie  [address] [address]   1965  DOS 2017  FOLLOW-UP VISIT      The patient returns for follow-up s/p left knee lateral meniscectomy and internal fixation of the lateral tibial plateau insufficiency fracture with bone substitute    Date of Surgery    10/30/17    Wound Status    Sutures Removed, Incisions are healing well with no surrounding erythema, and minimal ecchymosis. Exam    She continues to improve and is walking without ambulatory aids. Swelling in her legs and range of motion is increasing, but she has not done anything on her own    Additionally she is having pain in her right knee. Basically similar to the other, which is probably from compensation due to her left knee surgery. Plan    Formal physical therapy, bracing    Follow-up Appointment    6 weeks p.r.n. She is encouraged to return to her pain management doctors to be treated for pain. Kami Moscoso.  MD Salo Salvador MD

## 2017-12-12 ENCOUNTER — TELEPHONE (OUTPATIENT)
Dept: PULMONOLOGY | Age: 52
End: 2017-12-12

## 2017-12-12 NOTE — TELEPHONE ENCOUNTER
Patient cancelled appointment on 12/14/17 with Dr. Josh Coppola for 3 month f/u. Reason: pt cannot get transportation to bring he. Patient did reschedule appointment. Appointment rescheduled for 3/22/18. Last OV 9/14/17:    ASSESSMENT:  · SOB - PFTs are normal and do not explain her SOB  · Cough probably from post-nasal gtt   · Post-nasal gtt  · Rhinitis  · Tobacco abuse     PLAN:   · Continue PRN albuterol   · Refer to cardiology as she still has CP when she walks and she does not have COPD  · Continue claritin and flonase  · Tobacco cessation - she is cutting back. She is wanting to start nicotine patches.  Will start 14mg  · pvx 23 and flu vaccine UTD 2017  · F/u in 3 months

## 2018-02-02 ENCOUNTER — OFFICE VISIT (OUTPATIENT)
Dept: ORTHOPEDIC SURGERY | Age: 53
End: 2018-02-02

## 2018-02-02 VITALS
BODY MASS INDEX: 30.9 KG/M2 | DIASTOLIC BLOOD PRESSURE: 47 MMHG | HEART RATE: 64 BPM | HEIGHT: 64 IN | SYSTOLIC BLOOD PRESSURE: 122 MMHG | WEIGHT: 181 LBS

## 2018-02-02 DIAGNOSIS — R60.0 LEG EDEMA, LEFT: ICD-10-CM

## 2018-02-02 DIAGNOSIS — M17.0 PRIMARY OSTEOARTHRITIS OF BOTH KNEES: Primary | ICD-10-CM

## 2018-02-02 PROCEDURE — 4004F PT TOBACCO SCREEN RCVD TLK: CPT | Performed by: ORTHOPAEDIC SURGERY

## 2018-02-02 PROCEDURE — 3017F COLORECTAL CA SCREEN DOC REV: CPT | Performed by: ORTHOPAEDIC SURGERY

## 2018-02-02 PROCEDURE — G8427 DOCREV CUR MEDS BY ELIG CLIN: HCPCS | Performed by: ORTHOPAEDIC SURGERY

## 2018-02-02 PROCEDURE — G8417 CALC BMI ABV UP PARAM F/U: HCPCS | Performed by: ORTHOPAEDIC SURGERY

## 2018-02-02 PROCEDURE — G8484 FLU IMMUNIZE NO ADMIN: HCPCS | Performed by: ORTHOPAEDIC SURGERY

## 2018-02-02 PROCEDURE — 3014F SCREEN MAMMO DOC REV: CPT | Performed by: ORTHOPAEDIC SURGERY

## 2018-02-02 PROCEDURE — 99212 OFFICE O/P EST SF 10 MIN: CPT | Performed by: ORTHOPAEDIC SURGERY

## 2018-02-19 ENCOUNTER — OFFICE VISIT (OUTPATIENT)
Dept: ORTHOPEDIC SURGERY | Age: 53
End: 2018-02-19

## 2018-02-19 VITALS
SYSTOLIC BLOOD PRESSURE: 115 MMHG | DIASTOLIC BLOOD PRESSURE: 64 MMHG | HEART RATE: 70 BPM | WEIGHT: 181 LBS | HEIGHT: 64 IN | BODY MASS INDEX: 30.9 KG/M2

## 2018-02-19 DIAGNOSIS — M25.562 BILATERAL CHRONIC KNEE PAIN: ICD-10-CM

## 2018-02-19 DIAGNOSIS — M17.0 PRIMARY OSTEOARTHRITIS OF BOTH KNEES: Primary | ICD-10-CM

## 2018-02-19 DIAGNOSIS — M25.561 BILATERAL CHRONIC KNEE PAIN: ICD-10-CM

## 2018-02-19 DIAGNOSIS — G89.29 BILATERAL CHRONIC KNEE PAIN: ICD-10-CM

## 2018-02-19 PROCEDURE — 3017F COLORECTAL CA SCREEN DOC REV: CPT | Performed by: ORTHOPAEDIC SURGERY

## 2018-02-19 PROCEDURE — 99213 OFFICE O/P EST LOW 20 MIN: CPT | Performed by: ORTHOPAEDIC SURGERY

## 2018-02-19 PROCEDURE — G8484 FLU IMMUNIZE NO ADMIN: HCPCS | Performed by: ORTHOPAEDIC SURGERY

## 2018-02-19 PROCEDURE — G8427 DOCREV CUR MEDS BY ELIG CLIN: HCPCS | Performed by: ORTHOPAEDIC SURGERY

## 2018-02-19 PROCEDURE — 3014F SCREEN MAMMO DOC REV: CPT | Performed by: ORTHOPAEDIC SURGERY

## 2018-02-19 PROCEDURE — G8417 CALC BMI ABV UP PARAM F/U: HCPCS | Performed by: ORTHOPAEDIC SURGERY

## 2018-02-19 PROCEDURE — 4004F PT TOBACCO SCREEN RCVD TLK: CPT | Performed by: ORTHOPAEDIC SURGERY

## 2018-02-19 NOTE — LETTER
Attention    These are medical screening labs, not pre-admission surgery labs. Via Augustine Crowe M.D.  [phone]  [facility], [address] [facility]    Date:  2018    Name: Jaden Pratt    : 1965    Please take this form with you.       THE FOLLOWING LABS NEED TO BE COMPLETED:    _x__UA  _x__URINE C/S (THIS NEEDS TO BE DONE EVEN IF THE UA IS NORMAL)  _x__CBC W/ DIFF  _x__PT/INR  _x__PTT  _x__TRANSFERRIN  _x__ALBUMIN  _x__CHEM 7  _x__HEMAGLOBIN A1-C  ____OTHER: _____________________________________________                         Diagnosis:  LEFT KNEE OSTEOARTHRITIS            RT KNEE OA M17.11  LT KNEE OA M17.12         RT HIP OA  M16.11     LT HIP OA M16.12             Z01.812  (Pre-op examination code)      2018 11:55 AM  Rachid Wilkins PA-C

## 2018-02-19 NOTE — PROGRESS NOTES
mood and affect are appropriate. Lymphatic: The lymphatic examination bilaterally reveals all areas to be without enlargement or induration. Vascular: Examination reveals no swelling or calf tenderness. Peripheral pulses are palpable and 2+. Neurological: The patient has good coordination. There is no weakness or sensory deficit. Body mass index is 31.05 kg/m². Left greater than right Knee Examination:    Inspection:  No erythema or signs of infection. There are no cutaneous lesions. Valgus malalignment    Palpation:  There is tenderness to palpation along the lateral> medial> patellofemoral joint line. Range of Motion:  0 extension to 120 of active flexion. Strength:  4+/5 quadriceps and hamstrings    Special Tests: The knee is stable to varus valgus stressing/anterior posterior drawer. Negative Homans test.                                 Skin: There are no rashes, ulcerations or lesions. Gait: mildly antalgic favoring the right side    Reflex 2+ patellar      Examination of the right and left hip reveals intact skin. The patient demonstrates full painless range of motion with regards to flexion, abduction, internal and external rotation. There is no tenderness about the greater trochanter. There is a negative straight leg raise against resistance. Strength is 5/5 throughout all planes. Radiology:   X-rays obtained and reviewed in office:  Views 4 views including AP weightbearing, PA flexed weightbearing, lateral, and skyline  Location  right and left knee:    Impression:   End-stage arthritis left knee lateral compartment. Moderate arthritis of right knee lateral compartment. Bilateral knee medial and patellofemoral joints well-maintained    Impression:  Encounter Diagnoses   Name Primary?     Bilateral chronic knee pain     Primary osteoarthritis of both knees Yes       Office Procedures:  Orders Placed This Encounter   Procedures    Knee Bilateral Standard Extended VW     [id number]   

## 2018-03-19 ENCOUNTER — OFFICE VISIT (OUTPATIENT)
Dept: ORTHOPEDIC SURGERY | Age: 53
End: 2018-03-19

## 2018-03-19 DIAGNOSIS — M17.12 PRIMARY OSTEOARTHRITIS OF LEFT KNEE: Primary | ICD-10-CM

## 2018-03-19 PROCEDURE — 99214 OFFICE O/P EST MOD 30 MIN: CPT | Performed by: ORTHOPAEDIC SURGERY

## 2018-03-19 PROCEDURE — G8427 DOCREV CUR MEDS BY ELIG CLIN: HCPCS | Performed by: ORTHOPAEDIC SURGERY

## 2018-03-19 PROCEDURE — 3014F SCREEN MAMMO DOC REV: CPT | Performed by: ORTHOPAEDIC SURGERY

## 2018-03-19 PROCEDURE — L1830 KO IMMOB CANVAS LONG PRE OTS: HCPCS | Performed by: ORTHOPAEDIC SURGERY

## 2018-03-19 PROCEDURE — G8482 FLU IMMUNIZE ORDER/ADMIN: HCPCS | Performed by: ORTHOPAEDIC SURGERY

## 2018-03-19 PROCEDURE — 4004F PT TOBACCO SCREEN RCVD TLK: CPT | Performed by: ORTHOPAEDIC SURGERY

## 2018-03-19 PROCEDURE — 3017F COLORECTAL CA SCREEN DOC REV: CPT | Performed by: ORTHOPAEDIC SURGERY

## 2018-03-19 PROCEDURE — G8417 CALC BMI ABV UP PARAM F/U: HCPCS | Performed by: ORTHOPAEDIC SURGERY

## 2018-03-19 NOTE — LETTER
CONSENT TO SURGICAL OR MEDICAL PROCEDURE    PATIENTS NAMES: Rangel Marin 1965  45 y.o.      [phone] (home)   DATE/TIME: 3/19/2018 1:23 PM    1) I consent that Dr. Luca Mendiola perform one or more surgical and or medical procedures on the above named patient at: [address] [place] [address]/[facility] to treat the condition(s) which appear indicated by the diagnostic studies already preformed. I have been told in general terms the nature and purpose of the procedure(s) and what the procedure(s) is/are expected to accomplish. They procedure(s) are as follows:   LEFT ROBOTIC ASSISTED TOTAL KNEE REPLACEMENT (DELL)     2) It has been explained to me by the informing physician that during the course of any surgical or medical procedure unforeseen condition(s) may be revealed that necessitate an extension of the original procedure(s) or a different procedure(s) than set forth in Paragraph 1. I therefore consent that the above named physician perform such additional or different procedure(s) as are necessary or desirable in the exercise of his professional judgement. 3) I have been made aware by the informing physician of certain reasonably known risks that are associated with the procedure(s) set forth in Paragraph 1.  I understand the reasonably known risk to be: Including but not limited to: CVA, infection, M.I., Phlebitis, Cardiac Arrest and Pulmonary Embolism, Loss of Circulation, Nerve Injury, Delayed Healing, Recurrence, Loss of extremity/digit, R.S.D., Screw breakage, Arthritis, Pain, Swelling, Stiffness, Failure of Prothesis, Fracture, Leg length discrepancy, Wound complication/non-healing, need for further surgery and persistent pain.   4) I have also been informed by the informing physician that there are other risks, from both known and unknown causes, that are attendant to the performance of any surgical or medical procedure(s). I am aware that the practice of surgery and medicine is not an exact science, and I acknowledge that no guarantees have been made to me concerning the results of the procedure(s). 5) I consent to the taking of photographs before, during and after the procedure(s) for scientific and educational purposes. I also understand that medical students and residents may participate in the procedure(s) set forth in Paragraph 1, and I consent to their participation under the supervision of the above named physician. 6) I consent to the administration of anesthesia and to the use of such anesthetics as may be deemed advisable by the anesthesiologist engaged to administer anesthesia. 7) I certify that I have read and understand this consent to the surgical or medical procedure(s); that all the information contained herein was disclosed to me by the informing physician prior to my signing; that all blanks or statements requiring insertions or completion were filled in and inapplicable paragraphs, if any, were stricken before I signed; and that all questions asked by me about the procedure(s) have been fully answered by the informing physician in a satisfactory manner.    ________________________________                           _______________________________  Signature of patient                                                                  Blessing Londono M.D.  ________________________________                           ________________________________  Signature of Informing Physician                                           Informing Physician (Print)    If patient is unable to sign or is a minor, complete one of the following:   A) Patient is a minor ______________ years of age.    B) Patient is unable to sign because_________________________________________________    The undersigned represents that he or she is duly authorized to execute to this consent for and on the behalf of the above named patient.    ________________________________             __________________________________________  Witness                                                                         Parent/Spouse/Guardian/Other:_________________    Medical Record#  Insurance  Smartphone:  Yes   Or   No  Email:                 You have signed a consent to have a total joint replacement surgery performed. Before you can proceed with surgery the following things must be done. Please use this form as a checklist.      _____   Please schedule your Physical Therapy functional evaluation. This evaluation must be done at the [facility] and [facility])                           locations. _____   Please take your lab orders and get your blood work done at [facility] or [facility] [employer].      _____  Christiano Munguia will need to go to [employer] to complete registration and the medical questionnaire prior to your physical therapy evaluation. Do not schedule an appointment with your primary care physician until you have a surgery date. This pre-op exam has to be within 30 days of the surgery. Once you have completed both the labs and the evaluation please call Yuri Ciaran @ [phone] to let her know. Once verification of the PT Evaluation and completed labs has been determined you will be called and set up for surgery. This may take 1-2 days to check results and return your phone call.

## 2018-03-19 NOTE — PROGRESS NOTES
Chief Complaint    Knee Pain (bilat knee pain wants to discuss if she is a candidate for tkr)      History of Present Illness:  Silas Shane is a 45 y.o. female returns today for follow-up evaluation of ongoing bilateral knee pain, left greater than right. Patient continues to have pain throughout her left knee. She reports the level pain is 9/10. She's previously before surgeries in her most recent was in November 2017 when she had a knee arthroscopy and subchondroplasty of the lateral compartment. Prior to that she's tried cortisone injections and viscose supplementation. She continues to have persistent knee pain which affects her quality of life and levels of activity. She has difficulty climbing stairs and getting up and seated positions. Aggravating factors include standing, walking, stairs. Does cause sleep disturbances at night. We saw her previously and recommended smoking sensation programs in consultation with her pulmonologist who is since cleared her for surgery. She has reduced her smoking down to 3-4 cigarettes per day. She is also prediabetic but is unsure about what her most recent hemoglobin A1c is. PAIN:   Pain Assessment  Location Modifiers: Right, Left  Severity of Pain: 9 (lt knee hurts more)  Frequency of Pain: Intermittent  Aggravating Factors: Standing, Walking, Stairs  Limiting Behavior: Yes  Result of Injury: No  Work-Related Injury: No  Are there other pain locations you wish to document?: No    The patients chronic pain has gradually worsening over the last 5 years. The patient rates pain on a level of 9/10. Pain impacts patients ability to drive, sleep and climb stairs. Medical History:  Patient's medications, allergies, past medical, surgical, social and family histories were reviewed and updated as appropriate. Medication Management  Patient has been treated with NSAIDs, Steroids and Analgesics with Minimal relief for 5 years.     Review of was educated and fit by a healthcare professional with expert knowledge and specialization in brace application while under the direct supervision of the treating physician. Verbal and written instructions for the use of and application of this item were provided. They were instructed to contact the office immediately should the brace result in increased pain, decreased sensation, increased swelling or worsening of the condition. Treatment Plan: Today we've gone over the patient's diagnosis and options for treatment. We again did discuss both surgical and nonsurgical treatment options. We do believe the patient is ready to proceed with surgical intervention and she would like to sign consent for a left total knee replacement, robotic-assisted. We do require a nondiagnostic CT scan prior to surgery. The patient has also again been instructed on smoking cessation. We expect 6 weeks of nonsmoking prior to surgery to reduce the increased risk of complications. She understands that if she does not quit smoking we will postpone surgery until she can do so. We discussed the risk, benefits, and potential complications of total knee replacement arthroplasty surgery. The patient voiced their understanding to concerns that include infection, deep vein thrombosis, neurological injury, and delayed rehabilitation. The patient also realizes that there are concerns regarding the potential need for manipulation under anesthesia if range of motion proves to be problematic. The patient also understands that there is always a chance of dystrophy and anesthetic complications that would include a stroke, cardiopulmonary pathology, and even death. We also discussed the rehabilitation involved with this operation and options that involved not only the hospitalization but then the potential of either going home with visiting home therapy versus going to a rehabilitation facility.  The patient also realizes the need for

## 2018-03-28 ENCOUNTER — TELEPHONE (OUTPATIENT)
Dept: ORTHOPEDIC SURGERY | Age: 53
End: 2018-03-28

## 2018-04-01 ENCOUNTER — HOSPITAL ENCOUNTER (OUTPATIENT)
Dept: PHYSICAL THERAPY | Age: 53
Discharge: HOME | End: 2018-04-30
Attending: ORTHOPAEDIC SURGERY | Admitting: ORTHOPAEDIC SURGERY

## 2018-04-03 ENCOUNTER — HOSPITAL ENCOUNTER (OUTPATIENT)
Dept: PHYSICAL THERAPY | Age: 53
Discharge: HOME | End: 2018-03-31
Admitting: ORTHOPAEDIC SURGERY

## 2018-04-04 ENCOUNTER — TELEPHONE (OUTPATIENT)
Dept: PULMONOLOGY | Age: 53
End: 2018-04-04

## 2018-04-16 ENCOUNTER — HOSPITAL ENCOUNTER (OUTPATIENT)
Dept: PHYSICAL THERAPY | Age: 53
Discharge: HOME | End: 2018-04-17
Admitting: ORTHOPAEDIC SURGERY

## 2018-04-18 DIAGNOSIS — M25.562 CHRONIC PAIN OF LEFT KNEE: Primary | ICD-10-CM

## 2018-04-18 DIAGNOSIS — G89.29 CHRONIC PAIN OF LEFT KNEE: Primary | ICD-10-CM

## 2018-05-01 ENCOUNTER — HOSPITAL ENCOUNTER (OUTPATIENT)
Dept: PHYSICAL THERAPY | Age: 53
Discharge: HOME | End: 2018-05-31
Attending: ORTHOPAEDIC SURGERY | Admitting: ORTHOPAEDIC SURGERY

## 2018-05-03 ENCOUNTER — OFFICE VISIT (OUTPATIENT)
Dept: ORTHOPEDIC SURGERY | Age: 53
End: 2018-05-03

## 2018-05-03 ENCOUNTER — HOSPITAL ENCOUNTER (OUTPATIENT)
Dept: CT IMAGING | Age: 53
Discharge: HOME | End: 2018-05-03
Attending: ORTHOPAEDIC SURGERY | Admitting: ORTHOPAEDIC SURGERY

## 2018-05-03 VITALS
WEIGHT: 164.9 LBS | HEART RATE: 71 BPM | SYSTOLIC BLOOD PRESSURE: 120 MMHG | DIASTOLIC BLOOD PRESSURE: 73 MMHG | BODY MASS INDEX: 28.15 KG/M2 | HEIGHT: 64 IN

## 2018-05-03 DIAGNOSIS — M25.562 CHRONIC PAIN OF LEFT KNEE: ICD-10-CM

## 2018-05-03 DIAGNOSIS — M17.12 PRIMARY OSTEOARTHRITIS OF LEFT KNEE: Primary | ICD-10-CM

## 2018-05-03 DIAGNOSIS — G89.29 CHRONIC PAIN OF LEFT KNEE: ICD-10-CM

## 2018-05-03 DIAGNOSIS — M25.562 PAIN IN LEFT KNEE: ICD-10-CM

## 2018-05-03 DIAGNOSIS — M17.12 PRIMARY OSTEOARTHRITIS OF LEFT KNEE: ICD-10-CM

## 2018-05-03 LAB
BASOPHILS ABSOLUTE: 0 K/UL (ref 0–0.2)
BASOPHILS RELATIVE PERCENT: 0.5 %
EOSINOPHILS ABSOLUTE: 0.1 K/UL (ref 0–0.6)
EOSINOPHILS RELATIVE PERCENT: 1.2 %
HCT VFR BLD CALC: 36.8 % (ref 36–48)
HEMOGLOBIN: 13 G/DL (ref 12–16)
LYMPHOCYTES ABSOLUTE: 2.8 K/UL (ref 1–5.1)
LYMPHOCYTES RELATIVE PERCENT: 30.4 %
MCH RBC QN AUTO: 32.4 PG (ref 26–34)
MCHC RBC AUTO-ENTMCNC: 35.2 G/DL (ref 31–36)
MCV RBC AUTO: 92 FL (ref 80–100)
MONOCYTES ABSOLUTE: 0.5 K/UL (ref 0–1.3)
MONOCYTES RELATIVE PERCENT: 5.5 %
NEUTROPHILS ABSOLUTE: 5.7 K/UL (ref 1.7–7.7)
NEUTROPHILS RELATIVE PERCENT: 62.4 %
PDW BLD-RTO: 13.9 % (ref 12.4–15.4)
PLATELET # BLD: 267 K/UL (ref 135–450)
PMV BLD AUTO: 8.5 FL (ref 5–10.5)
RBC # BLD: 4 M/UL (ref 4–5.2)
WBC # BLD: 9.1 K/UL (ref 4–11)

## 2018-05-03 PROCEDURE — 1036F TOBACCO NON-USER: CPT | Performed by: ORTHOPAEDIC SURGERY

## 2018-05-03 PROCEDURE — G8427 DOCREV CUR MEDS BY ELIG CLIN: HCPCS | Performed by: ORTHOPAEDIC SURGERY

## 2018-05-03 PROCEDURE — G8417 CALC BMI ABV UP PARAM F/U: HCPCS | Performed by: ORTHOPAEDIC SURGERY

## 2018-05-03 PROCEDURE — 99212 OFFICE O/P EST SF 10 MIN: CPT | Performed by: ORTHOPAEDIC SURGERY

## 2018-05-03 PROCEDURE — 3017F COLORECTAL CA SCREEN DOC REV: CPT | Performed by: ORTHOPAEDIC SURGERY

## 2018-05-04 LAB
ESTIMATED AVERAGE GLUCOSE: 119.8 MG/DL
HBA1C MFR BLD: 5.8 %

## 2018-05-08 ENCOUNTER — TELEPHONE (OUTPATIENT)
Dept: ORTHOPEDIC SURGERY | Age: 53
End: 2018-05-08

## 2018-05-15 PROBLEM — Z96.652 STATUS POST TOTAL LEFT KNEE REPLACEMENT: Status: ACTIVE | Noted: 2018-05-15

## 2018-05-15 PROBLEM — E78.5 HYPERLIPIDEMIA: Status: ACTIVE | Noted: 2018-05-15

## 2018-05-15 PROBLEM — E11.9 DM (DIABETES MELLITUS) (HCC): Status: ACTIVE | Noted: 2018-05-15

## 2018-05-15 PROBLEM — M17.12 OSTEOARTHRITIS OF LEFT KNEE: Status: ACTIVE | Noted: 2018-05-15

## 2018-05-15 PROBLEM — J44.9 COPD (CHRONIC OBSTRUCTIVE PULMONARY DISEASE) (HCC): Status: ACTIVE | Noted: 2018-05-15

## 2018-05-15 PROBLEM — K21.9 GERD (GASTROESOPHAGEAL REFLUX DISEASE): Status: ACTIVE | Noted: 2018-05-15

## 2018-05-17 ENCOUNTER — TELEPHONE (OUTPATIENT)
Dept: ORTHOPEDIC SURGERY | Age: 53
End: 2018-05-17

## 2018-05-17 RX ORDER — KETOROLAC TROMETHAMINE 10 MG/1
10 TABLET, FILM COATED ORAL EVERY 6 HOURS PRN
Qty: 20 TABLET | Refills: 0 | Status: SHIPPED | OUTPATIENT
Start: 2018-05-17 | End: 2022-07-26

## 2018-05-17 RX ORDER — PROMETHAZINE HYDROCHLORIDE 25 MG/1
25 TABLET ORAL EVERY 6 HOURS PRN
Qty: 30 TABLET | Refills: 0 | Status: SHIPPED | OUTPATIENT
Start: 2018-05-17 | End: 2022-07-26

## 2018-05-29 ENCOUNTER — TELEPHONE (OUTPATIENT)
Dept: PULMONOLOGY | Age: 53
End: 2018-05-29

## 2018-05-29 DIAGNOSIS — Z96.652 TOTAL KNEE REPLACEMENT STATUS, LEFT: Primary | ICD-10-CM

## 2018-05-29 RX ORDER — OXYCODONE HYDROCHLORIDE AND ACETAMINOPHEN 5; 325 MG/1; MG/1
TABLET ORAL
Qty: 40 TABLET | Refills: 0 | Status: SHIPPED | OUTPATIENT
Start: 2018-05-29 | End: 2018-05-30 | Stop reason: SDUPTHER

## 2018-05-30 DIAGNOSIS — Z96.652 TOTAL KNEE REPLACEMENT STATUS, LEFT: Primary | ICD-10-CM

## 2018-05-30 RX ORDER — OXYCODONE HYDROCHLORIDE AND ACETAMINOPHEN 5; 325 MG/1; MG/1
TABLET ORAL
Qty: 40 TABLET | Refills: 0 | Status: SHIPPED | OUTPATIENT
Start: 2018-05-30 | End: 2018-06-05

## 2018-06-01 ENCOUNTER — OFFICE VISIT (OUTPATIENT)
Dept: ORTHOPEDIC SURGERY | Age: 53
End: 2018-06-01

## 2018-06-01 DIAGNOSIS — Z96.652 STATUS POST TOTAL LEFT KNEE REPLACEMENT: Primary | ICD-10-CM

## 2018-06-01 DIAGNOSIS — M25.562 PAIN IN JOINT OF LEFT KNEE: ICD-10-CM

## 2018-06-01 PROCEDURE — 99024 POSTOP FOLLOW-UP VISIT: CPT | Performed by: PHYSICIAN ASSISTANT

## 2018-06-11 ENCOUNTER — TELEPHONE (OUTPATIENT)
Dept: ORTHOPEDIC SURGERY | Age: 53
End: 2018-06-11

## 2018-06-12 ENCOUNTER — TELEPHONE (OUTPATIENT)
Dept: ORTHOPEDIC SURGERY | Age: 53
End: 2018-06-12

## 2018-06-12 DIAGNOSIS — M79.89 PAIN AND SWELLING OF LEFT LOWER LEG: Primary | ICD-10-CM

## 2018-06-12 DIAGNOSIS — Z96.652 STATUS POST TOTAL LEFT KNEE REPLACEMENT: ICD-10-CM

## 2018-06-12 DIAGNOSIS — M17.12 PRIMARY OSTEOARTHRITIS OF LEFT KNEE: ICD-10-CM

## 2018-06-12 DIAGNOSIS — M79.662 PAIN AND SWELLING OF LEFT LOWER LEG: Primary | ICD-10-CM

## 2018-06-12 DIAGNOSIS — Z96.652 STATUS POST TOTAL LEFT KNEE REPLACEMENT: Primary | ICD-10-CM

## 2018-06-12 RX ORDER — OXYCODONE HYDROCHLORIDE AND ACETAMINOPHEN 5; 325 MG/1; MG/1
1 TABLET ORAL EVERY 6 HOURS PRN
Qty: 28 TABLET | Refills: 0 | Status: SHIPPED | OUTPATIENT
Start: 2018-06-12 | End: 2018-06-22 | Stop reason: SDUPTHER

## 2018-06-12 RX ORDER — OXYCODONE HYDROCHLORIDE AND ACETAMINOPHEN 5; 325 MG/1; MG/1
1 TABLET ORAL EVERY 6 HOURS PRN
Qty: 40 TABLET | Refills: 0 | Status: CANCELLED | OUTPATIENT
Start: 2018-06-12 | End: 2018-06-19

## 2018-06-13 ENCOUNTER — TELEPHONE (OUTPATIENT)
Dept: ORTHOPEDIC SURGERY | Age: 53
End: 2018-06-13

## 2018-06-20 ENCOUNTER — HOSPITAL ENCOUNTER (OUTPATIENT)
Dept: VASCULAR LAB | Age: 53
Discharge: HOME | End: 2018-06-20
Attending: PHYSICIAN ASSISTANT | Admitting: PHYSICIAN ASSISTANT

## 2018-06-20 DIAGNOSIS — M17.12 PRIMARY OSTEOARTHRITIS OF LEFT KNEE: ICD-10-CM

## 2018-06-22 ENCOUNTER — OFFICE VISIT (OUTPATIENT)
Dept: ORTHOPEDIC SURGERY | Age: 53
End: 2018-06-22

## 2018-06-22 DIAGNOSIS — Z96.652 STATUS POST TOTAL LEFT KNEE REPLACEMENT: Primary | ICD-10-CM

## 2018-06-22 PROCEDURE — 99024 POSTOP FOLLOW-UP VISIT: CPT | Performed by: PHYSICIAN ASSISTANT

## 2018-06-22 RX ORDER — OXYCODONE HYDROCHLORIDE AND ACETAMINOPHEN 5; 325 MG/1; MG/1
1 TABLET ORAL EVERY 6 HOURS PRN
Qty: 28 TABLET | Refills: 0 | Status: SHIPPED | OUTPATIENT
Start: 2018-06-22 | End: 2018-06-28 | Stop reason: SDUPTHER

## 2018-06-22 RX ORDER — MELOXICAM 15 MG/1
15 TABLET ORAL DAILY
Qty: 90 TABLET | Refills: 0 | Status: SHIPPED | OUTPATIENT
Start: 2018-06-22 | End: 2022-07-26

## 2018-06-28 ENCOUNTER — OFFICE VISIT (OUTPATIENT)
Dept: ORTHOPEDIC SURGERY | Age: 53
End: 2018-06-28

## 2018-06-28 DIAGNOSIS — Z96.652 STATUS POST TOTAL LEFT KNEE REPLACEMENT: Primary | ICD-10-CM

## 2018-06-28 PROCEDURE — 99024 POSTOP FOLLOW-UP VISIT: CPT | Performed by: ORTHOPAEDIC SURGERY

## 2018-06-28 RX ORDER — OXYCODONE HYDROCHLORIDE AND ACETAMINOPHEN 5; 325 MG/1; MG/1
1 TABLET ORAL EVERY 6 HOURS PRN
Qty: 28 TABLET | Refills: 0 | Status: SHIPPED | OUTPATIENT
Start: 2018-06-28 | End: 2018-07-19 | Stop reason: SDUPTHER

## 2018-07-10 ENCOUNTER — TELEPHONE (OUTPATIENT)
Dept: PULMONOLOGY | Age: 53
End: 2018-07-10

## 2018-07-10 NOTE — TELEPHONE ENCOUNTER
Patient did not show for 3 month cardio fua with  on 7/10/18    Patient was also no show on: 4/4/18    LOV   ASSESSMENT:9/14/17  · SOB - PFTs are normal and do not explain her SOB  · Cough probably from post-nasal gtt   · Post-nasal gtt  · Rhinitis  · Tobacco abuse     PLAN:   · Continue PRN albuterol   · Refer to cardiology as she still has CP when she walks and she does not have COPD  · Continue claritin and flonase  · Tobacco cessation - she is cutting back. She is wanting to start nicotine patches.  Will start 14mg  · pvx 23 and flu vaccine UTD 2017  · F/u in 3 months

## 2018-07-19 ENCOUNTER — OFFICE VISIT (OUTPATIENT)
Dept: ORTHOPEDIC SURGERY | Age: 53
End: 2018-07-19

## 2018-07-19 DIAGNOSIS — Z96.652 STATUS POST TOTAL LEFT KNEE REPLACEMENT: Primary | ICD-10-CM

## 2018-07-19 DIAGNOSIS — M25.562 LEFT KNEE PAIN, UNSPECIFIED CHRONICITY: ICD-10-CM

## 2018-07-19 DIAGNOSIS — M17.12 PRIMARY OSTEOARTHRITIS OF LEFT KNEE: ICD-10-CM

## 2018-07-19 PROCEDURE — 99024 POSTOP FOLLOW-UP VISIT: CPT | Performed by: ORTHOPAEDIC SURGERY

## 2018-07-19 RX ORDER — OXYCODONE HYDROCHLORIDE AND ACETAMINOPHEN 5; 325 MG/1; MG/1
1 TABLET ORAL EVERY 6 HOURS PRN
Qty: 28 TABLET | Refills: 0 | Status: SHIPPED | OUTPATIENT
Start: 2018-07-19 | End: 2018-08-10 | Stop reason: SDUPTHER

## 2018-08-10 ENCOUNTER — OFFICE VISIT (OUTPATIENT)
Dept: ORTHOPEDIC SURGERY | Age: 53
End: 2018-08-10

## 2018-08-10 VITALS — DIASTOLIC BLOOD PRESSURE: 81 MMHG | HEART RATE: 61 BPM | SYSTOLIC BLOOD PRESSURE: 137 MMHG

## 2018-08-10 DIAGNOSIS — M79.89 PAIN AND SWELLING OF LEFT LOWER LEG: ICD-10-CM

## 2018-08-10 DIAGNOSIS — Z86.718 HISTORY OF DVT OF LOWER EXTREMITY: ICD-10-CM

## 2018-08-10 DIAGNOSIS — Z96.652 STATUS POST TOTAL LEFT KNEE REPLACEMENT: ICD-10-CM

## 2018-08-10 DIAGNOSIS — M25.562 LEFT KNEE PAIN, UNSPECIFIED CHRONICITY: Primary | ICD-10-CM

## 2018-08-10 DIAGNOSIS — M17.12 PRIMARY OSTEOARTHRITIS OF LEFT KNEE: ICD-10-CM

## 2018-08-10 DIAGNOSIS — M79.662 PAIN AND SWELLING OF LEFT LOWER LEG: ICD-10-CM

## 2018-08-10 DIAGNOSIS — L03.116 CELLULITIS OF LEFT LOWER EXTREMITY: ICD-10-CM

## 2018-08-10 PROCEDURE — 99024 POSTOP FOLLOW-UP VISIT: CPT | Performed by: PHYSICIAN ASSISTANT

## 2018-08-10 RX ORDER — CEPHALEXIN 500 MG/1
500 CAPSULE ORAL 4 TIMES DAILY
COMMUNITY
End: 2022-07-26

## 2018-08-10 RX ORDER — OXYCODONE HYDROCHLORIDE AND ACETAMINOPHEN 5; 325 MG/1; MG/1
1 TABLET ORAL EVERY 8 HOURS PRN
Qty: 20 TABLET | Refills: 0 | Status: SHIPPED | OUTPATIENT
Start: 2018-08-10 | End: 2018-08-17

## 2018-08-10 NOTE — PROGRESS NOTES
control. I will go ahead and refill her pain medication but this the her final prescription for us before she begins to get this from her primary care physician. I would like to see her back next week for another wound check.

## 2018-08-14 ENCOUNTER — TELEPHONE (OUTPATIENT)
Dept: ORTHOPEDIC SURGERY | Age: 53
End: 2018-08-14

## 2018-12-17 ENCOUNTER — TELEPHONE (OUTPATIENT)
Dept: ORTHOPEDIC SURGERY | Age: 53
End: 2018-12-17

## 2019-01-08 ENCOUNTER — OFFICE VISIT (OUTPATIENT)
Dept: ORTHOPEDIC SURGERY | Age: 54
End: 2019-01-08
Payer: MEDICAID

## 2019-01-08 VITALS
HEIGHT: 64 IN | SYSTOLIC BLOOD PRESSURE: 117 MMHG | HEART RATE: 64 BPM | BODY MASS INDEX: 32.03 KG/M2 | WEIGHT: 187.61 LBS | DIASTOLIC BLOOD PRESSURE: 73 MMHG

## 2019-01-08 DIAGNOSIS — M25.812 OS ACROMIALE OF LEFT SHOULDER: Primary | ICD-10-CM

## 2019-01-08 DIAGNOSIS — S46.012A STRAIN OF LEFT ROTATOR CUFF CAPSULE, INITIAL ENCOUNTER: ICD-10-CM

## 2019-01-08 PROCEDURE — G8417 CALC BMI ABV UP PARAM F/U: HCPCS | Performed by: ORTHOPAEDIC SURGERY

## 2019-01-08 PROCEDURE — 3017F COLORECTAL CA SCREEN DOC REV: CPT | Performed by: ORTHOPAEDIC SURGERY

## 2019-01-08 PROCEDURE — G8484 FLU IMMUNIZE NO ADMIN: HCPCS | Performed by: ORTHOPAEDIC SURGERY

## 2019-01-08 PROCEDURE — 99243 OFF/OP CNSLTJ NEW/EST LOW 30: CPT | Performed by: ORTHOPAEDIC SURGERY

## 2019-01-08 PROCEDURE — G8427 DOCREV CUR MEDS BY ELIG CLIN: HCPCS | Performed by: ORTHOPAEDIC SURGERY

## 2019-04-30 ENCOUNTER — TELEPHONE (OUTPATIENT)
Dept: PULMONOLOGY | Age: 54
End: 2019-04-30

## 2019-05-28 ENCOUNTER — TELEPHONE (OUTPATIENT)
Dept: PULMONOLOGY | Age: 54
End: 2019-05-28

## 2019-05-28 NOTE — TELEPHONE ENCOUNTER
Patient did not show for Shortness of breath follow-up appointment  with  on 5/28/19    Same Day Cancellation: No    Patient rescheduled:  NA    New appointment: n/a    Patient was also no show on: 7/10/18 and 4/4/18    LOV     ASSESSMENT:9/14/17  · SOB - PFTs are normal and do not explain her SOB  · Cough probably from post-nasal gtt   · Post-nasal gtt  · Rhinitis  · Tobacco abuse     PLAN:   · Continue PRN albuterol   · Refer to cardiology as she still has CP when she walks and she does not have COPD  · Continue claritin and flonase  · Tobacco cessation - she is cutting back. She is wanting to start nicotine patches.  Will start 14mg  · pvx 23 and flu vaccine UTD 2017  · F/u in 3 months

## 2021-10-15 ENCOUNTER — CLINICAL DOCUMENTATION (OUTPATIENT)
Dept: OTHER | Age: 56
End: 2021-10-15

## 2022-07-20 NOTE — DISCHARGE INSTRUCTIONS
[facility] Physician Orders and Discharge [facility]  [address], [address]  [place], [facility]  Telephone: [phone]      Fax: [phone] home care company:  N/a . Your wound-care supplies will be provided by:  N/a . NAME:  Nicholas Lindquist   YOB: 1965  PRIMARY DIAGNOSIS FOR WOUND CARE CENTER:  Venous leg ulcer . Wound cleansing:   Do not scrub or use excessive force. Wash hands with soap and water before and after dressing changes. Prior to applying a clean dressing, cleanse wound with normal saline, wound cleanser, or mild soap and water. Ask your physician or nurse before getting the wound(s) wet in the shower. Wound care for home:    RIGHT LATERAL ANKLE:  LOTRISONE TO ANTONETTE WOUND REDNESS  COLLAGEN- WOUND  GAUZE  CALAMINE CO FLEX FOR COMPRESSION  Leave in place for the week     Please note, all wounds (unless stated otherwise here) were mechanically debrided at the time of cleansing here in the wound-care center today, so a small amount of pain, drainage or bleeding from that process might be expected, and is normal.     All products for home use, including multiple products for a single wound if applicable, are medically necessary in order to achieve the best chance at timely wound healing. See provider documentation for details if needed. Substituted dressings applied in the [facility] today, if applicable:    N/a    New orders for this week (labs, imaging, medications, etc.):    Spect CT scan - please order FOR RIGHT ANKLE    RX FOR ANTIFUNGAL CREAM TO BE APPLIED TO LEFT FOOT AND HANDS AS NEEDED PER DR. MONTALVO    Additional instructions for specific diagnoses:  Guicho Carrion      Your physician has ordered Compression for treatment of venous ulcers, venous insufficiency,and/or Lymphedema. CALAMINE CO FLEX Is a     Layer wrap- do not remove until your next scheduled visit in [facility]- do not get wet. * If your wrap falls down, becomes painful or you have decreased sensation, and/or excessive drainage- please call the [facility] for RN visit to get your compression wrap changed   * You need to exercice as tolerated- walking is good   * Elevate your legs preferrably above level of your heart when sitting as much as possible   * The Goal of this therapy is to reduce Edema and get into long term compression garments to control venous insufficiency, lymphedema and reduce occurrence of venous ulcers         F/U Appointment is with Dr. Kaylee Murray in 1 week, on                                   at                       .     Your nurse  is Sadie Richardson RN. If we applied slip-resistant hospital socks today, be sure to remove them at least once a day to inspect your toes or feet, even if you're not changing the wraps or dressings underneath. If you see anything concerning (redness, excess moisture, etc), please call and let us know right away. Should you experience any significant changes in your wound(s) (including redness, increased warmth, increased pain, increased drainage, odor, or fever) or have questions about your wound care, please contact the [address] [place] [address] at [phone] Monday-Thursday from 8:00 am - 4:30 pm, or Friday from 8:00 am - 2:30 pm.  If you need help with your wound outside these hours and cannot wait until we are again available, contact your home-care company (if applicable), your PCP, or go to the nearest emergency room.

## 2022-07-26 ENCOUNTER — HOSPITAL ENCOUNTER (OUTPATIENT)
Dept: WOUND CARE | Age: 57
Discharge: HOME | End: 2022-07-26
Payer: MEDICAID

## 2022-07-26 VITALS
TEMPERATURE: 99.5 F | RESPIRATION RATE: 20 BRPM | HEIGHT: 64 IN | SYSTOLIC BLOOD PRESSURE: 129 MMHG | WEIGHT: 193 LBS | HEART RATE: 83 BPM | BODY MASS INDEX: 32.95 KG/M2 | DIASTOLIC BLOOD PRESSURE: 69 MMHG

## 2022-07-26 DIAGNOSIS — L98.499 DIABETES MELLITUS WITH SKIN ULCER (HCC): ICD-10-CM

## 2022-07-26 DIAGNOSIS — E11.622 DIABETES MELLITUS WITH SKIN ULCER (HCC): ICD-10-CM

## 2022-07-26 DIAGNOSIS — R60.0 LOCALIZED EDEMA: ICD-10-CM

## 2022-07-26 DIAGNOSIS — I73.89 OTHER SPECIFIED PERIPHERAL VASCULAR DISEASES (HCC): ICD-10-CM

## 2022-07-26 DIAGNOSIS — L97.314 NON-PRESSURE CHRONIC ULCER OF RIGHT ANKLE WITH NECROSIS OF BONE (HCC): ICD-10-CM

## 2022-07-26 PROCEDURE — 97597 DBRDMT OPN WND 1ST 20 CM/<: CPT

## 2022-07-26 PROCEDURE — 29581 APPL MULTLAYER CMPRN SYS LEG: CPT

## 2022-07-26 PROCEDURE — 99203 OFFICE O/P NEW LOW 30 MIN: CPT

## 2022-07-26 RX ORDER — HYDROCODONE BITARTRATE AND ACETAMINOPHEN 5; 325 MG/1; MG/1
1 TABLET ORAL EVERY 6 HOURS PRN
COMMUNITY

## 2022-07-26 RX ORDER — CLOTRIMAZOLE 1 %
CREAM (GRAM) TOPICAL
Qty: 60 G | Refills: 2 | Status: SHIPPED | OUTPATIENT
Start: 2022-07-26 | End: 2022-08-02

## 2022-07-26 RX ORDER — LIDOCAINE 40 MG/G
CREAM TOPICAL ONCE
Status: CANCELLED | OUTPATIENT
Start: 2022-07-26 | End: 2022-07-26

## 2022-07-26 RX ORDER — LIDOCAINE HYDROCHLORIDE 20 MG/ML
JELLY TOPICAL ONCE
Status: CANCELLED | OUTPATIENT
Start: 2022-07-26 | End: 2022-07-26

## 2022-07-26 RX ORDER — CLOBETASOL PROPIONATE 0.5 MG/G
OINTMENT TOPICAL ONCE
Status: CANCELLED | OUTPATIENT
Start: 2022-07-26 | End: 2022-07-26

## 2022-07-26 RX ORDER — BACITRACIN ZINC AND POLYMYXIN B SULFATE 500; 1000 [USP'U]/G; [USP'U]/G
OINTMENT TOPICAL ONCE
Status: CANCELLED | OUTPATIENT
Start: 2022-07-26 | End: 2022-07-26

## 2022-07-26 RX ORDER — TETRACYCLINE HYDROCHLORIDE 500 MG/1
500 CAPSULE ORAL 2 TIMES DAILY
COMMUNITY

## 2022-07-26 RX ORDER — BETAMETHASONE DIPROPIONATE 0.05 %
OINTMENT (GRAM) TOPICAL ONCE
Status: CANCELLED | OUTPATIENT
Start: 2022-07-26 | End: 2022-07-26

## 2022-07-26 RX ORDER — LIDOCAINE HYDROCHLORIDE 40 MG/ML
SOLUTION TOPICAL ONCE
Status: CANCELLED | OUTPATIENT
Start: 2022-07-26 | End: 2022-07-26

## 2022-07-26 RX ORDER — LIDOCAINE 50 MG/G
OINTMENT TOPICAL ONCE
Status: CANCELLED | OUTPATIENT
Start: 2022-07-26 | End: 2022-07-26

## 2022-07-26 RX ORDER — GINSENG 100 MG
CAPSULE ORAL ONCE
Status: CANCELLED | OUTPATIENT
Start: 2022-07-26 | End: 2022-07-26

## 2022-07-26 RX ORDER — BACITRACIN, NEOMYCIN, POLYMYXIN B 400; 3.5; 5 [USP'U]/G; MG/G; [USP'U]/G
OINTMENT TOPICAL ONCE
Status: CANCELLED | OUTPATIENT
Start: 2022-07-26 | End: 2022-07-26

## 2022-07-26 RX ORDER — GENTAMICIN SULFATE 1 MG/G
OINTMENT TOPICAL ONCE
Status: CANCELLED | OUTPATIENT
Start: 2022-07-26 | End: 2022-07-26

## 2022-07-26 ASSESSMENT — PAIN DESCRIPTION - LOCATION: LOCATION: ANKLE

## 2022-07-26 ASSESSMENT — PAIN DESCRIPTION - PAIN TYPE: TYPE: ACUTE PAIN

## 2022-07-26 ASSESSMENT — PAIN DESCRIPTION - ORIENTATION: ORIENTATION: RIGHT

## 2022-07-26 ASSESSMENT — PAIN SCALES - GENERAL: PAINLEVEL_OUTOF10: 7

## 2022-07-26 ASSESSMENT — PAIN DESCRIPTION - FREQUENCY: FREQUENCY: INTERMITTENT

## 2022-07-26 ASSESSMENT — PAIN - FUNCTIONAL ASSESSMENT: PAIN_FUNCTIONAL_ASSESSMENT: ACTIVITIES ARE NOT PREVENTED

## 2022-07-26 ASSESSMENT — PAIN DESCRIPTION - ONSET: ONSET: ON-GOING

## 2022-07-26 NOTE — PLAN OF CARE
Pt seen in [facility] as initial visit for Right lateral ankle wound - non healing surgical wound 2019 - Hx of ankle fracture - pt had hardware that was removed - reports issues with bone infection - has been on multiple Antibx - currently on Tetra cycline per orthopaedic MD. [place]br[place] per Dr. Connie Hoffman - ordered Spect CT scan to r/o osteo- treatmen as follows  RIGHT LATERAL ANKLE:  LOTRISONE TO ANTONETTE WOUND REDNESS  COLLAGEN- [id number] in place for the week     Dr. Connie Hoffman ordered antifungal cream for Left foot also. Discussed smoking cessation with pt . ASHELY WNL BLE today. Spect CT scan ordered for 8-3-22.  Reviewed AVS - f/u in 1 week

## 2022-07-26 NOTE — PROGRESS NOTES
[facility]  Progress Note and Procedure Note      Newton Vicente  AGE: 60 y.o. GENDER: female  : 1965  TODAY'S DATE:  2022    Subjective:     Chief Complaint   Patient presents with    Wound Check         HISTORY of PRESENT ILLNESS HPI     Newton Vicente is a 60 y.o. female who presents today for wound evaluation. History of Wound: Patient admits to having an ankle fracture in 2019 that has had previous bone infections and has had recurrent ulceration and skin infections stemming from the site since then. She has been treated by multiple different doctors and has been on multiple different antibiotics. She is currently taking antibiotic doxycycline for her previous skin infection. She has no other complaints at this time. She admits to being a smoker. She admits to being prediabetic.   Wound Pain:  intermittent  Severity:  3 / 10   Wound Type:  diabetic, non-healing/non-surgical, undetermined, refractory osteomyelitis, and lymphedema  Modifying Factors:  edema, venous stasis, lymphedema, diabetes, poor glucose control, shear force, obesity, smoking, arterial insufficiency, decreased tissue oxygenation, and immunosuppression  Associated Signs/Symptoms:  edema, drainage, and pain        PAST MEDICAL HISTORY        Diagnosis Date    COPD (chronic obstructive pulmonary disease) ([facility])     GERD (gastroesophageal reflux disease)     Hiatal hernia    Hyperlipidemia     Primary osteoarthritis of left knee 2017    Restless leg syndrome     Tobacco abuse 2017    Type 2 diabetes mellitus without complication ([facility]     BORDERLINE, DIET       PAST SURGICAL HISTORY    Past Surgical History:   Procedure Laterality Date    JOINT REPLACEMENT      KNEE ARTHROPLASTY Left 05/15/2018    LEFT TOTAL KNEE REPLACEMENT MAKOPLASTY, PLATELET RICH PLASMA WITH ADDUCTOR CANAL BLOCK FOR PAIN CONTROL GUY    KNEE ARTHROSCOPY Left     KNEE ARTHROSCOPY Left 10/30/2017    LEFT KNEE VIDEO ARTHROSCOPY, LATERAL MENISECTOMY, CHONDROPLASTY, INTERNAL FIXATION LATERAL TIBIAL PLATEAU INSUFFICIENCY FRACTURE WITH BONE SUBSTITUTE     KNEE SURGERY      TUBAL LIGATION         FAMILY HISTORY    Family History   Problem Relation Age of Onset    Cancer Father         Prostate    Asthma Brother     Cancer Brother 61        lung cancer    Alcohol Abuse Neg Hx     Allergy (Severe) Neg Hx     Anemia Neg Hx     Arthritis Neg Hx     Arrhythmia Neg Hx     Colon Cancer Neg Hx     Depression Neg Hx        SOCIAL HISTORY    Social History     Tobacco Use    Smoking status: Every Day     Packs/day: 0.50     Years: 5.00     Pack years: 2.50     Types: Cigarettes    Smokeless tobacco: Never    Tobacco comments:      NOT SMOKING UNTIL AFTER SURGERY   Vaping Use    Vaping Use: Never used   Substance Use Topics    Alcohol use: No    Drug use: No       ALLERGIES    Allergies   Allergen Reactions    Aspirin Nausea And Vomiting       MEDICATIONS  Reviewed      REVIEW OF SYSTEMS    Pertinent items are noted in HPI. Objective:      /69   Pulse 83   Temp 99.5 °F (37.5 °C) (Oral)   Resp 20   Ht 5' 4\" (1.626 m)   Wt 193 lb (87.5 kg)   LMP 04/10/2008   BMI 33.13 kg/m²     PHYSICAL EXAM    Vascular: Vascular status Impaired  palpable pedal pulses, right DP1/4 and PT1/4, left DP1/4 and PT1/4. CFT 3 seconds digits 1 to 5 bilateral.  Hair growthAbsent  both lower extremities and feet. Skin temperature is warm to warm from pretibial area to distal digits bilateral.  Exam is negative for rubor, pallor, cyanosis or signs of acute vascular compromise bilaterally. Exam is positive for edema bilateral lower extremity. Varicosities Present bilateral lower extremity. Neuro: Neurologic status diminished bilateral with epicritic Present , proprioceptive Present, vibratory sensationPresent and protopathic Present. DTRs Present bilateral Achilles. There were no reproducible neuritic symptoms on exam bilateral feet/ankles.   Derm: Ulceration to right lateral ankle at the level of the surgical site incision. Ecchymosis Absent  bilateral feet/foot. Musculoskeletal: No pain with debridement. 5/5 muscle strength in/eversion and dorsi/plantarflexion bilateral feet. No gross instability noted. 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 Devitalized Tissue Debrided:  fibrin, slough, and exudate    Pre Debridement Measurements:  Are located in the Wound Documentation Flow Sheet    Wound #: 1     Post  Debridement Measurements:  Incision 10/30/17 Knee Left; Anterior (Active)   Number of days: 1396       Wound 07/26/22 # 1 Right Lateral Ankle, Venous, Partial Thickness, Onset 2019 (Active)   Wound Image   07/26/22 0843   Wound Etiology Venous 07/26/22 0843   Wound Cleansed Soap and water 07/26/22 0843   Wound Length (cm) 1.4 cm 07/26/22 0843   Wound Width (cm) 0.3 cm 07/26/22 0843   Wound Depth (cm) 0.1 cm 07/26/22 0843   Wound Surface Area (cm^2) 0.42 cm^2 07/26/22 0843   Wound Volume (cm^3) 0.042 cm^3 07/26/22 0843   Post-Procedure Length (cm) 1.4 cm 07/26/22 0920   Post-Procedure Width (cm) 0.3 cm 07/26/22 0920   Post-Procedure Depth (cm) 0.1 cm 07/26/22 0920   Post-Procedure Surface Area (cm^2) 0.42 cm^2 07/26/22 0920   Post-Procedure Volume (cm^3) 0.042 cm^3 07/26/22 0920   Wound Assessment Pink/red 07/26/22 0843   Drainage Amount Scant 07/26/22 0843   Drainage Description Yellow 07/26/22 0843   Odor None 07/26/22 0843   Nicky-wound Assessment Fragile; Intact; Other (Comment) 07/26/22 0843   Number of days: 0           Total Surface Area Debrided:  0.42 sq cm     Percentage of wound debrided 100%    Bleeding:  Minimal    Hemostasis Achieved:  by pressure    Procedural Pain:  0  / 10     Post Procedural Pain:  0 / 10     Response to treatment:  Well tolerated by patient. Plan:   Patient examined and evaluated  Wound sharply debrided without incident  SPECT-CT ordered to evaluate for osteomyelitis and infection in the right ankle  Follow-up in 1 week  Keep leg elevated when not active  Discussed appropriate diabetic diet  Discussed smoking cessation  The nature of the patient's condition was explained in depth.  The patient was informed that their compliance to the treatment plan is paramount to successful healing and prevention of further ulceration and/or infection     Discharge Treatment leave the compression dressing intact get SPECT-CT    Written Patient Discharge Instructions Given            Electronically signed by Kevin Benton DPM on 7/26/2022 at 9:36 AM

## 2022-07-27 NOTE — DISCHARGE INSTRUCTIONS
[facility] Physician Orders and Discharge 800 [facility]  [address], [address]  [place], [facility]  Telephone: [phone]      Fax: [phone] home care company:   N/a . Your wound-care supplies will be provided by:  N/a . NAME:  Beth Thayer   YOB: 1965  PRIMARY DIAGNOSIS FOR WOUND CARE CENTER:  Venous leg ulcer . Wound cleansing:  Do not scrub or use excessive force. Wash hands with soap and water before and after dressing changes. Prior to applying a clean dressing, cleanse wound with normal saline, wound cleanser, or mild soap and water. Ask your physician or nurse before getting the wound(s) wet in the shower. Wound care for home:     RIGHT LATERAL ANKLE:  LOTRISONE TO ANTONETTE WOUND REDNESS  Gentamycin cream  GAUZE  CALAMINE CO FLEX FOR COMPRESSION  Leave in place for the week      Please note, all wounds (unless stated otherwise here) were mechanically debrided at the time of cleansing here in the wound-care center today, so a small amount of pain, drainage or bleeding from that process might be expected, and is normal.     All products for home use, including multiple products for a single wound if applicable, are medically necessary in order to achieve the best chance at timely wound healing. See provider documentation for details if needed. Substituted dressings applied in the [address] today, if applicable:     N/a     New orders for this week (labs, imaging, medications, etc.):     Spect CT scan - 8-3-22       Additional instructions for specific diagnoses:  Guicho Carrion      Your physician has ordered Compression for treatment of venous ulcers, venous insufficiency,and/or Lymphedema. CALAMINE CO FLEX Is a     Layer wrap- do not remove until your next scheduled visit in [address] [place] [address] [address]- do not get wet.    * If your wrap falls down, becomes painful or you have decreased sensation, and/or excessive drainage- please call the [facility] for RN visit to get your compression wrap changed   * You need to exercice as tolerated- walking is good   * Elevate your legs preferrably above level of your heart when sitting as much as possible   * The Goal of this therapy is to reduce Edema and get into long term compression garments to control venous insufficiency, lymphedema and reduce occurrence of venous ulcers            F/U Appointment is with Dr. Mini Peters in 1 week, on       8-9-22                            at          1100             . Your nurse  is JULIANA MELENDEZ. If we applied slip-resistant hospital socks today, be sure to remove them at least once a day to inspect your toes or feet, even if you're not changing the wraps or dressings underneath. If you see anything concerning (redness, excess moisture, etc), please call and let us know right away. Should you experience any significant changes in your wound(s) (including redness, increased warmth, increased pain, increased drainage, odor, or fever) or have questions about your wound care, please contact the [facility] at [phone] Monday-Thursday from 8:00 am - 4:30 pm, or Friday from 8:00 am - 2:30 pm.  If you need help with your wound outside these hours and cannot wait until we are again available, contact your home-care company (if applicable), your PCP, or go to the nearest emergency room.

## 2022-08-02 ENCOUNTER — HOSPITAL ENCOUNTER (OUTPATIENT)
Dept: WOUND CARE | Age: 57
Discharge: HOME | End: 2022-08-02
Payer: MEDICAID

## 2022-08-02 VITALS
TEMPERATURE: 98.6 F | BODY MASS INDEX: 32.95 KG/M2 | RESPIRATION RATE: 18 BRPM | SYSTOLIC BLOOD PRESSURE: 130 MMHG | HEART RATE: 85 BPM | WEIGHT: 193 LBS | HEIGHT: 64 IN | DIASTOLIC BLOOD PRESSURE: 55 MMHG

## 2022-08-02 DIAGNOSIS — E11.622 DIABETES MELLITUS WITH SKIN ULCER (HCC): ICD-10-CM

## 2022-08-02 DIAGNOSIS — L98.499 DIABETES MELLITUS WITH SKIN ULCER (HCC): ICD-10-CM

## 2022-08-02 DIAGNOSIS — L97.314 NON-PRESSURE CHRONIC ULCER OF RIGHT ANKLE WITH NECROSIS OF BONE (HCC): ICD-10-CM

## 2022-08-02 DIAGNOSIS — I73.89 OTHER SPECIFIED PERIPHERAL VASCULAR DISEASES (HCC): Primary | ICD-10-CM

## 2022-08-02 PROCEDURE — 29581 APPL MULTLAYER CMPRN SYS LEG: CPT

## 2022-08-02 RX ORDER — LIDOCAINE HYDROCHLORIDE 20 MG/ML
JELLY TOPICAL ONCE
Status: CANCELLED | OUTPATIENT
Start: 2022-08-02 | End: 2022-08-02

## 2022-08-02 RX ORDER — BACITRACIN, NEOMYCIN, POLYMYXIN B 400; 3.5; 5 [USP'U]/G; MG/G; [USP'U]/G
OINTMENT TOPICAL ONCE
Status: CANCELLED | OUTPATIENT
Start: 2022-08-02 | End: 2022-08-02

## 2022-08-02 RX ORDER — BETAMETHASONE DIPROPIONATE 0.05 %
OINTMENT (GRAM) TOPICAL ONCE
Status: CANCELLED | OUTPATIENT
Start: 2022-08-02 | End: 2022-08-02

## 2022-08-02 RX ORDER — LIDOCAINE HYDROCHLORIDE 40 MG/ML
SOLUTION TOPICAL ONCE
Status: CANCELLED | OUTPATIENT
Start: 2022-08-02 | End: 2022-08-02

## 2022-08-02 RX ORDER — GINSENG 100 MG
CAPSULE ORAL ONCE
Status: CANCELLED | OUTPATIENT
Start: 2022-08-02 | End: 2022-08-02

## 2022-08-02 RX ORDER — CLOBETASOL PROPIONATE 0.5 MG/G
OINTMENT TOPICAL ONCE
Status: CANCELLED | OUTPATIENT
Start: 2022-08-02 | End: 2022-08-02

## 2022-08-02 RX ORDER — GENTAMICIN SULFATE 1 MG/G
OINTMENT TOPICAL ONCE
Status: CANCELLED | OUTPATIENT
Start: 2022-08-02 | End: 2022-08-02

## 2022-08-02 RX ORDER — GENTAMICIN SULFATE 1 MG/G
OINTMENT TOPICAL ONCE
Status: DISCONTINUED | OUTPATIENT
Start: 2022-08-02 | End: 2022-08-03 | Stop reason: HOSPADM

## 2022-08-02 RX ORDER — BACITRACIN ZINC AND POLYMYXIN B SULFATE 500; 1000 [USP'U]/G; [USP'U]/G
OINTMENT TOPICAL ONCE
Status: CANCELLED | OUTPATIENT
Start: 2022-08-02 | End: 2022-08-02

## 2022-08-02 RX ORDER — LIDOCAINE 50 MG/G
OINTMENT TOPICAL ONCE
Status: CANCELLED | OUTPATIENT
Start: 2022-08-02 | End: 2022-08-02

## 2022-08-02 RX ORDER — LIDOCAINE 40 MG/G
CREAM TOPICAL ONCE
Status: CANCELLED | OUTPATIENT
Start: 2022-08-02 | End: 2022-08-02

## 2022-08-02 RX ORDER — LIDOCAINE 40 MG/G
CREAM TOPICAL ONCE
Status: DISCONTINUED | OUTPATIENT
Start: 2022-08-02 | End: 2022-08-03 | Stop reason: HOSPADM

## 2022-08-02 ASSESSMENT — PAIN DESCRIPTION - FREQUENCY: FREQUENCY: INTERMITTENT

## 2022-08-02 ASSESSMENT — PAIN - FUNCTIONAL ASSESSMENT: PAIN_FUNCTIONAL_ASSESSMENT: PREVENTS OR INTERFERES SOME ACTIVE ACTIVITIES AND ADLS

## 2022-08-02 ASSESSMENT — PAIN DESCRIPTION - ORIENTATION: ORIENTATION: RIGHT

## 2022-08-02 ASSESSMENT — PAIN DESCRIPTION - ONSET: ONSET: ON-GOING

## 2022-08-02 ASSESSMENT — PAIN DESCRIPTION - PAIN TYPE: TYPE: CHRONIC PAIN

## 2022-08-02 ASSESSMENT — PAIN DESCRIPTION - LOCATION: LOCATION: ANKLE

## 2022-08-02 ASSESSMENT — PAIN DESCRIPTION - DESCRIPTORS: DESCRIPTORS: ACHING

## 2022-08-02 ASSESSMENT — PAIN SCALES - GENERAL: PAINLEVEL_OUTOF10: 8

## 2022-08-02 NOTE — PLAN OF CARE
Pt seen in [facility] - Lateral ankle wound close to healed but reddness persists- pt reports feeling better - No debride today - spect CT scan ordered tomorrow - cont treatment as follows     RIGHT LATERAL ANKLE:  LOTRISONE TO ANTONETTE WOUND REDNESS  Gentamycin cream  GAUZE  CALAMINE CO FLEX FOR COMPRESSION  Leave in place for the week    Reviewed AVS - f/u in 1 week

## 2022-08-02 NOTE — PROGRESS NOTES
[facility]  Progress Note and Procedure Note      Terir Bonilla  AGE: 60 y.o. GENDER: female  : 1965  TODAY'S DATE:  2022    Subjective:     Chief Complaint   Patient presents with    Wound Check         HISTORY of PRESENT ILLNESS HPI     Terri Bonilla is a 60 y.o. female who presents today for wound evaluation. History of Wound: Patient admits to having an ankle fracture in 2019 that has had previous bone infections and has had recurrent ulceration and skin infections stemming from the site since then. She has been treated by multiple different doctors and has been on multiple different antibiotics. Smoker and prediabetic. She has the dressing intact as directed. She is try to keep the leg elevated. She has no other complaints at this time.   Wound Pain:  intermittent  Severity:  3  10   Wound Type:  diabetic, non-healing/non-surgical, undetermined, refractory osteomyelitis, and lymphedema  Modifying Factors:  edema, venous stasis, lymphedema, diabetes, poor glucose control, shear force, obesity, smoking, arterial insufficiency, decreased tissue oxygenation, and immunosuppression  Associated Signs/Symptoms:  edema, drainage, and pain        PAST MEDICAL HISTORY        Diagnosis Date    COPD (chronic obstructive pulmonary disease) (H[facility])     GERD (gastroesophageal reflux disease)     Hiatal hernia    Hyperlipidemia     Primary osteoarthritis of left knee 2017    Restless leg syndrome     Tobacco abuse 2017    Type 2 diabetes mellitus without complication ([facility] [address]     BORDERLINE, DIET       PAST SURGICAL HISTORY    Past Surgical History:   Procedure Laterality Date    JOINT REPLACEMENT      KNEE ARTHROPLASTY Left 05/15/2018    LEFT TOTAL KNEE REPLACEMENT MAKOPLASTY, PLATELET RICH PLASMA WITH ADDUCTOR CANAL BLOCK FOR PAIN CONTROL GUY    KNEE ARTHROSCOPY Left 2013    KNEE ARTHROSCOPY Left 10/30/2017    LEFT KNEE VIDEO ARTHROSCOPY, LATERAL MENISECTOMY, CHONDROPLASTY, INTERNAL FIXATION LATERAL TIBIAL PLATEAU INSUFFICIENCY FRACTURE WITH BONE SUBSTITUTE     KNEE SURGERY      TUBAL LIGATION         FAMILY HISTORY    Family History   Problem Relation Age of Onset    Cancer Father         Prostate    Asthma Brother     Cancer Brother 61        lung cancer    Alcohol Abuse Neg Hx     Allergy (Severe) Neg Hx     Anemia Neg Hx     Arthritis Neg Hx     Arrhythmia Neg Hx     Colon Cancer Neg Hx     Depression Neg Hx        SOCIAL HISTORY    Social History     Tobacco Use    Smoking status: Every Day     Packs/day: 0.50     Years: 5.00     Pack years: 2.50     Types: Cigarettes    Smokeless tobacco: Never    Tobacco comments:      NOT SMOKING UNTIL AFTER SURGERY   Vaping Use    Vaping Use: Never used   Substance Use Topics    Alcohol use: No    Drug use: No       ALLERGIES    Allergies   Allergen Reactions    Aspirin Nausea And Vomiting       MEDICATIONS  Reviewed      REVIEW OF SYSTEMS    Pertinent items are noted in HPI. Objective:      BP (!) 130/55   Pulse 85   Temp 98.6 °F (37 °C) (Oral)   Resp 18   Ht 5' 4\" (1.626 m)   Wt 193 lb (87.5 kg)   LMP 04/10/2008   BMI 33.13 kg/m²     PHYSICAL EXAM    Vascular: Vascular status Impaired  palpable pedal pulses, right DP1/4 and PT1/4, left DP1/4 and PT1/4. CFT 3 seconds digits 1 to 5 bilateral.  Hair growthAbsent  both lower extremities and feet. Skin temperature is warm to warm from pretibial area to distal digits bilateral.  Exam is negative for rubor, pallor, cyanosis or signs of acute vascular compromise bilaterally. Exam is positive for edema bilateral lower extremity. Varicosities Present bilateral lower extremity. Neuro: Neurologic status diminished bilateral with epicritic Present , proprioceptive Present, vibratory sensationPresent and protopathic Present. DTRs Present bilateral Achilles. There were no reproducible neuritic symptoms on exam bilateral feet/ankles.   Derm: Ulceration to right lateral ankle at the level of the surgical site incision. Ecchymosis Absent  bilateral feet/foot. Musculoskeletal: No pain with debridement. 5/5 muscle strength in/eversion and dorsi/plantarflexion bilateral feet. No gross instability noted. 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 (HCC)    Relevant Medications    lidocaine (LMX) 4 % cream (Start on 8/2/2022 12:15 PM)    gentamicin (GARAMYCIN) 0.1 % ointment (Start on 8/2/2022 12:15 PM)    Other Relevant Orders    Initiate Outpatient Wound Care Protocol    Other specified peripheral vascular diseases ([facility] - Primary    Relevant Medications    lidocaine (LMX) 4 % cream (Start on 8/2/2022 12:15 PM)    gentamicin (GARAMYCIN) 0.1 % ointment (Start on 8/2/2022 12:15 PM)    Other Relevant Orders    Initiate Outpatient Wound Care Protocol       Post  Debridement Measurements:  Incision 10/30/17 Knee Left; Anterior (Active)   Number of days: 3111       Wound 07/26/22 # 1 Right Lateral Ankle, Venous, Partial Thickness, Onset 2019 (Active)   Wound Image   07/26/22 0843   Wound Etiology Venous 07/26/22 0843   Wound Cleansed Soap and water 07/26/22 0843   Wound Length (cm) 1.4 cm 07/26/22 0843   Wound Width (cm) 0.3 cm 07/26/22 0843   Wound Depth (cm) 0.1 cm 07/26/22 0843   Wound Surface Area (cm^2) 0.42 cm^2 07/26/22 0843   Wound Volume (cm^3) 0.042 cm^3 07/26/22 0843   Post-Procedure Length (cm) 1.4 cm 07/26/22 0920   Post-Procedure Width (cm) 0.3 cm 07/26/22 0920   Post-Procedure Depth (cm) 0.1 cm 07/26/22 0920   Post-Procedure Surface Area (cm^2) 0.42 cm^2 07/26/22 0920   Post-Procedure Volume (cm^3) 0.042 cm^3 07/26/22 0920   Wound Assessment Pink/red 07/26/22 0843   Drainage Amount Scant 07/26/22 0843   Drainage Description Yellow 07/26/22 0843   Odor None 07/26/22 0843   Nicky-wound Assessment Fragile; Intact; Other (Comment) 07/26/22 0843   Number of days: 0         Plan:   Patient examined and evaluated  Wound currently healed  SPECT-CT ordered to evaluate for osteomyelitis and infection in the right ankle scheduled for tomorrow  Follow-up in 1 week  Keep leg elevated when not active  Discussed appropriate diabetic diet  Discussed smoking cessation  Multilayer compression wrap. The nature of the patient's condition was explained in depth.  The patient was informed that their compliance to the treatment plan is paramount to successful healing and prevention of further ulceration and/or infection     Discharge Treatment leave the compression dressing intact get SPECT-CT    Written Patient Discharge Instructions Given            Electronically signed by Yvette Marc DPM on 8/2/2022 at 12:04 PM

## 2022-08-09 ENCOUNTER — HOSPITAL ENCOUNTER (OUTPATIENT)
Dept: WOUND CARE | Age: 57
Discharge: HOME | End: 2022-08-09

## 2022-08-10 ENCOUNTER — HOSPITAL ENCOUNTER (OUTPATIENT)
Dept: NUCLEAR MEDICINE | Age: 57
Discharge: HOME | End: 2022-08-10
Payer: MEDICAID

## 2022-08-10 PROCEDURE — 78802 RP LOCLZJ TUM WHBDY 1 D IMG: CPT

## 2022-08-10 PROCEDURE — 3430000000 HC RX DIAGNOSTIC RADIOPHARMACEUTICAL: Performed by: PODIATRIST

## 2022-08-10 PROCEDURE — A9547 IN111 OXYQUINOLINE: HCPCS | Performed by: PODIATRIST

## 2022-08-10 RX ORDER — INDIUM IN-111 OXYQUINOLINE 1 UG/ML
835 SOLUTION INTRAVENOUS
Status: COMPLETED | OUTPATIENT
Start: 2022-08-10 | End: 2022-08-10

## 2022-08-10 RX ADMIN — INDIUM IN-111 OXYQUINOLINE 0.84 MILLICURIE: 1 SOLUTION INTRAVENOUS at 15:24

## 2022-08-10 NOTE — DISCHARGE INSTRUCTIONS
[address] [facility] Physician Orders and Discharge 800 [facility]  [address], [address]  [place], [facility]  Telephone: [phone]      Fax: [phone] home care company:   N/a . Your wound-care supplies will be provided by:  N/a . NAME:  Terri Bonilla   YOB: 1965  PRIMARY DIAGNOSIS FOR WOUND CARE CENTER:  Venous leg ulcer . Wound cleansing:  Do not scrub or use excessive force. Wash hands with soap and water before and after dressing changes. Prior to applying a clean dressing, cleanse wound with normal saline, wound cleanser, or mild soap and water. Ask your physician or nurse before getting the wound(s) wet in the shower. Wound care for home:     RIGHT LATERAL ANKLE:  LOTRISONE TO ANTONETTE WOUND REDNESS  Gentamycin cream  GAUZE  CALAMINE CO FLEX FOR COMPRESSION  Leave in place for the week      Please note, all wounds (unless stated otherwise here) were mechanically debrided at the time of cleansing here in the wound-care center today, so a small amount of pain, drainage or bleeding from that process might be expected, and is normal.     All products for home use, including multiple products for a single wound if applicable, are medically necessary in order to achieve the best chance at timely wound healing. See provider documentation for details if needed. Substituted dressings applied in the [facility] today, if applicable:     N/a     New orders for this week (labs, imaging, medications, etc.):     Spect CT scan - 8-10-22        Additional instructions for specific diagnoses:  Guicho Carrion      Your physician has ordered Compression for treatment of venous ulcers, venous insufficiency,and/or Lymphedema. CALAMINE CO FLEX Is a     Layer wrap- do not remove until your next scheduled visit in [facility]- do not get wet.    * If your wrap falls down, becomes painful or you have decreased sensation, and/or excessive drainage- please call the [facility] for RN visit to get your compression wrap changed   * You need to exercice as tolerated- walking is good   * Elevate your legs preferrably above level of your heart when sitting as much as possible   * The Goal of this therapy is to reduce Edema and get into long term compression garments to control venous insufficiency, lymphedema and reduce occurrence of venous ulcers            F/U Appointment is with Dr. Tan Ray in 1 week, on       8-23-22               at                      .     Your nurse  is Mitchell Bell RN. If we applied slip-resistant hospital socks today, be sure to remove them at least once a day to inspect your toes or feet, even if you're not changing the wraps or dressings underneath. If you see anything concerning (redness, excess moisture, etc), please call and let us know right away. Should you experience any significant changes in your wound(s) (including redness, increased warmth, increased pain, increased drainage, odor, or fever) or have questions about your wound care, please contact the [facility] at [phone] Monday-Thursday from 8:00 am - 4:30 pm, or Friday from 8:00 am - 2:30 pm.  If you need help with your wound outside these hours and cannot wait until we are again available, contact your home-care company (if applicable), your PCP, or go to the nearest emergency room.

## 2022-08-11 ENCOUNTER — HOSPITAL ENCOUNTER (OUTPATIENT)
Dept: NUCLEAR MEDICINE | Age: 57
Discharge: HOME | End: 2022-08-11

## 2022-08-11 ENCOUNTER — HOSPITAL ENCOUNTER (OUTPATIENT)
Dept: WOUND CARE | Age: 57
Discharge: HOME | End: 2022-08-11
Payer: MEDICAID

## 2022-08-11 VITALS
TEMPERATURE: 97.7 F | RESPIRATION RATE: 18 BRPM | BODY MASS INDEX: 33.49 KG/M2 | HEART RATE: 78 BPM | SYSTOLIC BLOOD PRESSURE: 133 MMHG | WEIGHT: 196.2 LBS | DIASTOLIC BLOOD PRESSURE: 68 MMHG | HEIGHT: 64 IN

## 2022-08-11 DIAGNOSIS — L08.9 INFECTED ABRASION OF RIGHT ANKLE, INITIAL ENCOUNTER: ICD-10-CM

## 2022-08-11 DIAGNOSIS — L97.314 NON-PRESSURE CHRONIC ULCER OF RIGHT ANKLE WITH NECROSIS OF BONE (HCC): ICD-10-CM

## 2022-08-11 DIAGNOSIS — S90.511A INFECTED ABRASION OF RIGHT ANKLE, INITIAL ENCOUNTER: ICD-10-CM

## 2022-08-11 DIAGNOSIS — E11.622 DIABETES MELLITUS WITH SKIN ULCER (HCC): ICD-10-CM

## 2022-08-11 DIAGNOSIS — L98.499 DIABETES MELLITUS WITH SKIN ULCER (HCC): ICD-10-CM

## 2022-08-11 DIAGNOSIS — I73.89 OTHER SPECIFIED PERIPHERAL VASCULAR DISEASES (HCC): Primary | ICD-10-CM

## 2022-08-11 PROCEDURE — 99214 OFFICE O/P EST MOD 30 MIN: CPT

## 2022-08-11 RX ORDER — LIDOCAINE 40 MG/G
CREAM TOPICAL ONCE
Status: DISCONTINUED | OUTPATIENT
Start: 2022-08-11 | End: 2022-08-12 | Stop reason: HOSPADM

## 2022-08-11 RX ORDER — BACITRACIN ZINC AND POLYMYXIN B SULFATE 500; 1000 [USP'U]/G; [USP'U]/G
OINTMENT TOPICAL ONCE
Status: CANCELLED | OUTPATIENT
Start: 2022-08-11 | End: 2022-08-11

## 2022-08-11 RX ORDER — BACITRACIN, NEOMYCIN, POLYMYXIN B 400; 3.5; 5 [USP'U]/G; MG/G; [USP'U]/G
OINTMENT TOPICAL ONCE
Status: CANCELLED | OUTPATIENT
Start: 2022-08-11 | End: 2022-08-11

## 2022-08-11 RX ORDER — LIDOCAINE 40 MG/G
CREAM TOPICAL ONCE
Status: CANCELLED | OUTPATIENT
Start: 2022-08-11 | End: 2022-08-11

## 2022-08-11 RX ORDER — GINSENG 100 MG
CAPSULE ORAL ONCE
Status: CANCELLED | OUTPATIENT
Start: 2022-08-11 | End: 2022-08-11

## 2022-08-11 RX ORDER — CLOBETASOL PROPIONATE 0.5 MG/G
OINTMENT TOPICAL ONCE
Status: CANCELLED | OUTPATIENT
Start: 2022-08-11 | End: 2022-08-11

## 2022-08-11 RX ORDER — LIDOCAINE HYDROCHLORIDE 40 MG/ML
SOLUTION TOPICAL ONCE
Status: CANCELLED | OUTPATIENT
Start: 2022-08-11 | End: 2022-08-11

## 2022-08-11 RX ORDER — LIDOCAINE 50 MG/G
OINTMENT TOPICAL ONCE
Status: CANCELLED | OUTPATIENT
Start: 2022-08-11 | End: 2022-08-11

## 2022-08-11 RX ORDER — LIDOCAINE HYDROCHLORIDE 20 MG/ML
JELLY TOPICAL ONCE
Status: CANCELLED | OUTPATIENT
Start: 2022-08-11 | End: 2022-08-11

## 2022-08-11 RX ORDER — GENTAMICIN SULFATE 1 MG/G
OINTMENT TOPICAL ONCE
Status: CANCELLED | OUTPATIENT
Start: 2022-08-11 | End: 2022-08-11

## 2022-08-11 RX ORDER — BETAMETHASONE DIPROPIONATE 0.05 %
OINTMENT (GRAM) TOPICAL ONCE
Status: CANCELLED | OUTPATIENT
Start: 2022-08-11 | End: 2022-08-11

## 2022-08-11 ASSESSMENT — PAIN SCALES - GENERAL: PAINLEVEL_OUTOF10: 0

## 2022-08-11 NOTE — PROGRESS NOTES
Rafael Shahid  Progress Note and Procedure Note      Haylie Clements  AGE: 60 y.o. GENDER: female  : 1965  TODAY'S DATE:  2022    Subjective:     Chief Complaint   Patient presents with    Wound Check         HISTORY of PRESENT ILLNESS HPI     Haylie Clements is a 60 y.o. female who presents today for wound evaluation. History of Wound: Patient admits to having an ankle fracture in 2019 that has had previous bone infections and has had recurrent ulceration and skin infections stemming from the site since then. She has been treated by multiple different doctors and has been on multiple different antibiotics. Smoker and prediabetic. She states she is down to 4 cigarettes a day. She has the dressing intact as directed. She is try to keep the leg elevated. She admits to falling and spraining her ankle. She had x-rays taken. She has no other complaints at this time.    Wound Pain:  intermittent  Severity:  3 / 10   Wound Type:  diabetic, non-healing/non-surgical, undetermined, refractory osteomyelitis, and lymphedema  Modifying Factors:  edema, venous stasis, lymphedema, diabetes, poor glucose control, shear force, obesity, smoking, arterial insufficiency, decreased tissue oxygenation, and immunosuppression  Associated Signs/Symptoms:  edema, drainage, and pain        PAST MEDICAL HISTORY        Diagnosis Date    COPD (chronic obstructive pulmonary disease) (HCC)     GERD (gastroesophageal reflux disease)     Hiatal hernia    Hyperlipidemia     Primary osteoarthritis of left knee 2017    Restless leg syndrome     Tobacco abuse 2017    Type 2 diabetes mellitus without complication (HCC)     BORDERLINE, DIET       PAST SURGICAL HISTORY    Past Surgical History:   Procedure Laterality Date    JOINT REPLACEMENT      KNEE ARTHROPLASTY Left 05/15/2018    LEFT TOTAL KNEE REPLACEMENT MAKOPLASTY, PLATELET RICH PLASMA WITH ADDUCTOR CANAL BLOCK FOR PAIN CONTROL GUY    KNEE ARTHROSCOPY Left 2013    KNEE ARTHROSCOPY Left 10/30/2017    LEFT KNEE VIDEO ARTHROSCOPY, LATERAL MENISECTOMY, CHONDROPLASTY, INTERNAL FIXATION LATERAL TIBIAL PLATEAU INSUFFICIENCY FRACTURE WITH BONE SUBSTITUTE     KNEE SURGERY      TUBAL LIGATION         FAMILY HISTORY    Family History   Problem Relation Age of Onset    Cancer Father         Prostate    Asthma Brother     Cancer Brother 61        lung cancer    Alcohol Abuse Neg Hx     Allergy (Severe) Neg Hx     Anemia Neg Hx     Arthritis Neg Hx     Arrhythmia Neg Hx     Colon Cancer Neg Hx     Depression Neg Hx        SOCIAL HISTORY    Social History     Tobacco Use    Smoking status: Every Day     Packs/day: 0.50     Years: 5.00     Pack years: 2.50     Types: Cigarettes    Smokeless tobacco: Never    Tobacco comments:      NOT SMOKING UNTIL AFTER SURGERY   Vaping Use    Vaping Use: Never used   Substance Use Topics    Alcohol use: No    Drug use: No       ALLERGIES    Allergies   Allergen Reactions    Aspirin Nausea And Vomiting       MEDICATIONS  Reviewed      REVIEW OF SYSTEMS    Pertinent items are noted in HPI. Objective:      /68   Pulse 78   Temp 97.7 °F (36.5 °C) (Oral)   Resp 18   Ht 5' 4\" (1.626 m)   Wt 196 lb 3.2 oz (89 kg)   LMP 04/10/2008   BMI 33.68 kg/m²     PHYSICAL EXAM    Vascular: Vascular status Impaired  palpable pedal pulses, right DP1/4 and PT1/4, left DP1/4 and PT1/4. CFT 3 seconds digits 1 to 5 bilateral.  Hair growthAbsent  both lower extremities and feet. Skin temperature is warm to warm from pretibial area to distal digits bilateral.  Exam is negative for rubor, pallor, cyanosis or signs of acute vascular compromise bilaterally. Exam is positive for edema bilateral lower extremity. Varicosities Present bilateral lower extremity. Neuro: Neurologic status diminished bilateral with epicritic Present , proprioceptive Present, vibratory sensationPresent and protopathic Present. DTRs Present bilateral Achilles. There were no reproducible neuritic symptoms on exam bilateral feet/ankles. Derm: Ulceration to right lateral ankle at the level of the surgical site incision healed. Ecchymosis Absent  bilateral feet/foot. Musculoskeletal: No pain with debridement. 5/5 muscle strength in/eversion and dorsi/plantarflexion bilateral feet. No gross instability noted. 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42 cm^2 07/26/22 0843   Wound Volume (cm^3) 0.042 cm^3 07/26/22 0843   Post-Procedure Length (cm) 1.4 cm 07/26/22 0920   Post-Procedure Width (cm) 0.3 cm 07/26/22 0920   Post-Procedure Depth (cm) 0.1 cm 07/26/22 0920   Post-Procedure Surface Area (cm^2) 0.42 cm^2 07/26/22 0920   Post-Procedure Volume (cm^3) 0.042 cm^3 07/26/22 0920   Wound Assessment Pink/red 07/26/22 0843   Drainage Amount Scant 07/26/22 0843   Drainage Description Yellow 07/26/22 0843   Odor None 07/26/22 0843   Nicky-wound Assessment Fragile; Intact; Other (Comment) 07/26/22 0843   Number of days: 0         Plan:   Patient examined and evaluated  Wound currently healed  SPECT-CT indium finishing up today  Follow-up in 1 week  Keep leg elevated when not active  Discussed appropriate diabetic diet  Discussed smoking cessation  Multilayer compression wrap.   Recommend figure-of-eight ankle brace instructions dispensed to the patient wear the brace daily all day. The nature of the patient's condition was explained in depth.  The patient was informed that their compliance to the treatment plan is paramount to successful healing and prevention of further ulceration and/or infection     Discharge Treatment leave the compression dressing intact get SPECT-CT    Written Patient Discharge Instructions Given            Electronically signed by Ericka Cordero DPM on 8/11/2022 at 9:29 AM

## 2022-08-11 NOTE — PLAN OF CARE
Wound care:     RIGHT LATERAL ANKLE:  Lotrisone to redness on ankle   Aquaphor to dry skin   Gauze  CALAMINE CO FLEX FOR COMPRESSION  Leave in place for the week      Left Lower Leg:   Lotrisone to rash  Medium Compression Spandagrip   Ace Wrap- single, does not need to go up entire leg, just to support sprained ankle ( Pt to purchase ankle brace and begin use in place of ace once she gets it)

## 2022-08-11 NOTE — DISCHARGE INSTRUCTIONS
[address] [facility] Physician Orders and Discharge [address], [address]  [place], [facility]  Telephone: [phone]      Fax: [phone] home care company:   N/a . Your wound-care supplies will be provided by:  N/a . NAME:  Mykel Palomino   YOB: 1965  PRIMARY DIAGNOSIS FOR WOUND CARE CENTER:  Venous leg ulcer . Wound cleansing:  Do not scrub or use excessive force. Wash hands with soap and water before and after dressing changes. Prior to applying a clean dressing, cleanse wound with normal saline, wound cleanser, or mild soap and water. Ask your physician or nurse before getting the wound(s) wet in the shower. Wound care for home:     RIGHT LATERAL ANKLE:  Lotrisone to redness on ankle   Aquaphor to dry skin   Gauze  CALAMINE CO FLEX FOR COMPRESSION  Leave in place for the week      Left Lower Leg:   Lotrisone to rash  Medium Compression Spandagrip   Ace Wrap- single, does not need to go up entire leg, just to support sprained ankle ( Pt to purchase ankle brace and begin use in place of ace once she gets it)     Please note, all wounds (unless stated otherwise here) were mechanically debrided at the time of cleansing here in the wound-care center today, so a small amount of pain, drainage or bleeding from that process might be expected, and is normal.     All products for home use, including multiple products for a single wound if applicable, are medically necessary in order to achieve the best chance at timely wound healing. See provider documentation for details if needed. Substituted dressings applied in the [facility] ER today, if applicable:     N/a     New orders for this week (labs, imaging, medications, etc.):     ASO Figure 8 Lace Up Ankle Wrap for left ankle- please purchase online and begin wearing for sprained ankle   Spect CT today after visit.          Additional instructions for specific diagnoses:  DISCUSSED SMOKING CESSATION      Your physician has ordered Compression for treatment of venous ulcers, venous insufficiency,and/or Lymphedema. CALAMINE CO FLEX Is a     Layer wrap- do not remove until your next scheduled visit in [facility]- do not get wet. * If your wrap falls down, becomes painful or you have decreased sensation, and/or excessive drainage- please call the [facility] for RN visit to get your compression wrap changed   * You need to exercice as tolerated- walking is good   * Elevate your legs preferrably above level of your heart when sitting as much as possible   * The Goal of this therapy is to reduce Edema and get into long term compression garments to control venous insufficiency, lymphedema and reduce occurrence of venous ulcers            F/U Appointment is with Dr. Guillermo Sharma in 1 week, on                                at                      .     Your nurse  is Emily Myers RN. If we applied slip-resistant hospital socks today, be sure to remove them at least once a day to inspect your toes or feet, even if you're not changing the wraps or dressings underneath. If you see anything concerning (redness, excess moisture, etc), please call and let us know right away. Should you experience any significant changes in your wound(s) (including redness, increased warmth, increased pain, increased drainage, odor, or fever) or have questions about your wound care, please contact the [email or web address] at [phone] Monday-Thursday from 8:00 am - 4:30 pm, or Friday from 8:00 am - 2:30 pm.  If you need help with your wound outside these hours and cannot wait until we are again available, contact your home-care company (if applicable), your PCP, or go to the nearest emergency room.

## 2022-08-16 ENCOUNTER — HOSPITAL ENCOUNTER (OUTPATIENT)
Dept: WOUND CARE | Age: 57
Discharge: HOME | End: 2022-08-16

## 2022-08-16 DIAGNOSIS — L98.499 DIABETES MELLITUS WITH SKIN ULCER (HCC): ICD-10-CM

## 2022-08-16 DIAGNOSIS — I73.89 OTHER SPECIFIED PERIPHERAL VASCULAR DISEASES (HCC): Primary | ICD-10-CM

## 2022-08-16 DIAGNOSIS — E11.622 DIABETES MELLITUS WITH SKIN ULCER (HCC): ICD-10-CM

## 2022-08-16 DIAGNOSIS — L97.314 NON-PRESSURE CHRONIC ULCER OF RIGHT ANKLE WITH NECROSIS OF BONE (HCC): ICD-10-CM

## 2022-08-17 NOTE — DISCHARGE INSTRUCTIONS
[address] [facility] Physician Orders and Discharge 800 [facility]  [address], [address]  [place], [facility]  Telephone: [phone]      Fax: [phone] home care company:   N/a . Your wound-care supplies will be provided by:  N/a . NAME:  Simón Monteiro   YOB: 1965  PRIMARY DIAGNOSIS FOR WOUND CARE CENTER:  Venous leg ulcer . Wound cleansing:  Do not scrub or use excessive force. Wash hands with soap and water before and after dressing changes. Prior to applying a clean dressing, cleanse wound with normal saline, wound cleanser, or mild soap and water. Ask your physician or nurse before getting the wound(s) wet in the shower. Wound care for home:     RIGHT LATERAL ANKLE:  LOTRISONE TO ANTONETTE WOUND REDNESS  Gentamycin cream  GAUZE  CALAMINE CO FLEX FOR COMPRESSION  Leave in place for the week      Please note, all wounds (unless stated otherwise here) were mechanically debrided at the time of cleansing here in the wound-care center today, so a small amount of pain, drainage or bleeding from that process might be expected, and is normal.     All products for home use, including multiple products for a single wound if applicable, are medically necessary in order to achieve the best chance at timely wound healing. See provider documentation for details if needed. Substituted dressings applied in the [facility] today, if applicable:     N/a     New orders for this week (labs, imaging, medications, etc.):     Spect CT scan - 8-10-22        Additional instructions for specific diagnoses:  Guicho Carrion      Your physician has ordered Compression for treatment of venous ulcers, venous insufficiency,and/or Lymphedema. CALAMINE CO FLEX Is a     Layer wrap- do not remove until your next scheduled visit in [facility]- do not get wet.    * If your wrap falls down, becomes painful or you have decreased sensation, and/or excessive drainage- please call the [address] [place] [address],[address] for RN visit to get your compression wrap changed   * You need to exercice as tolerated- walking is good   * Elevate your legs preferrably above level of your heart when sitting as much as possible   * The Goal of this therapy is to reduce Edema and get into long term compression garments to control venous insufficiency, lymphedema and reduce occurrence of venous ulcers            F/U Appointment is with Dr. Robert Kim in 1 week, on       8-23-22               at                      .     Your nurse  is Alyssa Abdi RN. If we applied slip-resistant hospital socks today, be sure to remove them at least once a day to inspect your toes or feet, even if you're not changing the wraps or dressings underneath. If you see anything concerning (redness, excess moisture, etc), please call and let us know right away. Should you experience any significant changes in your wound(s) (including redness, increased warmth, increased pain, increased drainage, odor, or fever) or have questions about your wound care, please contact the [facility] at [phone] Monday-Thursday from 8:00 am - 4:30 pm, or Friday from 8:00 am - 2:30 pm.  If you need help with your wound outside these hours and cannot wait until we are again available, contact your home-care company (if applicable), your PCP, or go to the nearest emergency room.

## 2022-08-18 ENCOUNTER — HOSPITAL ENCOUNTER (OUTPATIENT)
Dept: WOUND CARE | Age: 57
Discharge: HOME | End: 2022-08-18
Payer: MEDICAID

## 2022-08-18 VITALS
RESPIRATION RATE: 18 BRPM | DIASTOLIC BLOOD PRESSURE: 67 MMHG | SYSTOLIC BLOOD PRESSURE: 136 MMHG | BODY MASS INDEX: 32.81 KG/M2 | TEMPERATURE: 98.2 F | HEIGHT: 64 IN | WEIGHT: 192.2 LBS | HEART RATE: 71 BPM

## 2022-08-18 DIAGNOSIS — L97.314 NON-PRESSURE CHRONIC ULCER OF RIGHT ANKLE WITH NECROSIS OF BONE (HCC): ICD-10-CM

## 2022-08-18 DIAGNOSIS — E11.622 DIABETES MELLITUS WITH SKIN ULCER (HCC): ICD-10-CM

## 2022-08-18 DIAGNOSIS — L98.499 DIABETES MELLITUS WITH SKIN ULCER (HCC): ICD-10-CM

## 2022-08-18 DIAGNOSIS — I73.89 OTHER SPECIFIED PERIPHERAL VASCULAR DISEASES (HCC): Primary | ICD-10-CM

## 2022-08-18 PROCEDURE — 99213 OFFICE O/P EST LOW 20 MIN: CPT

## 2022-08-18 RX ORDER — GENTAMICIN SULFATE 1 MG/G
OINTMENT TOPICAL ONCE
Status: CANCELLED | OUTPATIENT
Start: 2022-08-18 | End: 2022-08-18

## 2022-08-18 RX ORDER — LIDOCAINE HYDROCHLORIDE 20 MG/ML
JELLY TOPICAL ONCE
Status: CANCELLED | OUTPATIENT
Start: 2022-08-18 | End: 2022-08-18

## 2022-08-18 RX ORDER — LIDOCAINE HYDROCHLORIDE 40 MG/ML
SOLUTION TOPICAL ONCE
Status: CANCELLED | OUTPATIENT
Start: 2022-08-18 | End: 2022-08-18

## 2022-08-18 RX ORDER — BACITRACIN ZINC AND POLYMYXIN B SULFATE 500; 1000 [USP'U]/G; [USP'U]/G
OINTMENT TOPICAL ONCE
Status: CANCELLED | OUTPATIENT
Start: 2022-08-18 | End: 2022-08-18

## 2022-08-18 RX ORDER — BACITRACIN, NEOMYCIN, POLYMYXIN B 400; 3.5; 5 [USP'U]/G; MG/G; [USP'U]/G
OINTMENT TOPICAL ONCE
Status: CANCELLED | OUTPATIENT
Start: 2022-08-18 | End: 2022-08-18

## 2022-08-18 RX ORDER — GINSENG 100 MG
CAPSULE ORAL ONCE
Status: CANCELLED | OUTPATIENT
Start: 2022-08-18 | End: 2022-08-18

## 2022-08-18 RX ORDER — CLOBETASOL PROPIONATE 0.5 MG/G
OINTMENT TOPICAL ONCE
Status: CANCELLED | OUTPATIENT
Start: 2022-08-18 | End: 2022-08-18

## 2022-08-18 RX ORDER — LIDOCAINE 40 MG/G
CREAM TOPICAL ONCE
Status: CANCELLED | OUTPATIENT
Start: 2022-08-18 | End: 2022-08-18

## 2022-08-18 RX ORDER — BETAMETHASONE DIPROPIONATE 0.05 %
OINTMENT (GRAM) TOPICAL ONCE
Status: CANCELLED | OUTPATIENT
Start: 2022-08-18 | End: 2022-08-18

## 2022-08-18 RX ORDER — LIDOCAINE 50 MG/G
OINTMENT TOPICAL ONCE
Status: CANCELLED | OUTPATIENT
Start: 2022-08-18 | End: 2022-08-18

## 2022-08-18 ASSESSMENT — PAIN SCALES - GENERAL: PAINLEVEL_OUTOF10: 0

## 2022-08-18 NOTE — DISCHARGE INSTRUCTIONS
[address] [facility] Physician Orders and Discharge [address], [address]  [place], [facility]  Telephone: [phone]      Fax: [phone] home care company:   N/a . Your wound-care supplies will be provided by: We are ordering your wound-care supplies from [employer]. Please note, depending on your insurance coverage, you may have out-of-pocket expenses for these supplies. Someone from [employer] should call you to confirm your order and discuss those potential costs before they ship your products -- please anticipate that call. If your out-of-pocket cost could be substantial, [facility] has a financial hardship program for patients who qualify, so please ask about that if you might need a hand. If you have any questions about your supplies or your potential out-of-pocket costs, or if you need to place an order for a refill of supplies (typically monthly), please call [phone]. NAME:  Mykel Palomino   YOB: 1965  PRIMARY DIAGNOSIS FOR WOUND CARE CENTER:  Venous leg ulcer . Wound cleansing:  Do not scrub or use excessive force. Wash hands with soap and water before and after dressing changes. Prior to applying a clean dressing, cleanse wound with normal saline, wound cleanser, or mild soap and water. Ask your physician or nurse before getting the wound(s) wet in the shower. Wound care for home:     RIGHT LATERAL ANKLE:  Medium Compression Spandagrip  Please place first thing in the morning and remove at bedtime. Please transition to compression garment once you receive it in the mail.       Please note, all wounds (unless stated otherwise here) were mechanically debrided at the time of cleansing here in the wound-care center today, so a small amount of pain, drainage or bleeding from that process might be expected, and is normal.     All products for home use, including multiple products for a single wound if applicable, are medically necessary in order to achieve the best chance at timely wound healing. See provider documentation for details if needed. Substituted dressings applied in the [facility] today, if applicable:     N/a     New orders for this week (labs, imaging, medications, etc.):     Amirah Stocking to be ordered from [employer]. Please measure today in clinic for stocking. Additional instructions for specific diagnoses:  DISCUSSED SMOKING CESSATION       WOUND CARE CENTER DISCHARGE INSTRUCTIONS:     CONGRATULATIONS! You have completed your treatment program!  We have created a list of general reminders to assist you in preventing future wounds:     1. Check your skin daily for reddened areas, abrasions, or sores. If a new wound is noted, call the [address] [place] [address] at [phone].   2.   Clean your using mild soap and warm (not hot) water, daily. 3.   If your skin appears dry, apply lotion as needed, but not between the toes. 4.   Avoid pressure and trauma to recently healed wounds. The skin is still very fragile and will break down easily. 5.   Always wear socks and well-fitting shoes. Never walk barefoot. 6.   Maintain a nutritious diet, minimizing alcohol, caffeine, and excess sugar. We can give you recommendations for increasing the amount of protein in your diet, if you're interested. 7. Probably the best thing you can do to prevent wounds in the future is to stop smoking, if you are a current smoker. If we've not discussed this before, please ask about ways that we could help you quit. 8. If you are significantly overweight (a BMI of > 27), [employer] [facility] has several different treatment options that can help you.  You can contact them at [phone].  ______________________________________________________________________     THINGS SPECIFIC TO YOUR WOUND(S):      VENOUS OR LYMPHEDEMA LEG ULCERS:  -- It's important to continue to elevate your legs a few times per day. -- Exercise is great for venous disease and lymphedema -- walking, cycling, swimming, etc; there are leg exercises that can be done while sitting too.  -- Your current compression therapy is: Carolon. We normally advise people to apply their compression garments first thing in the morning, and take them off at bedtime. These will normally be need to replaced every 6 months or so -- for replacements, we recommend you contact PCP        We strive for improvement. A survey will arrive in the mail from \"[facility]\". We appreciate your comments. F/U Appointment is with Dr. Wayne Correia in the future  as needed. Your nurse  is JULIANA MELENDEZ. If we applied slip-resistant hospital socks today, be sure to remove them at least once a day to inspect your toes or feet, even if you're not changing the wraps or dressings underneath. If you see anything concerning (redness, excess moisture, etc), please call and let us know right away. Should you experience any significant changes in your wound(s) (including redness, increased warmth, increased pain, increased drainage, odor, or fever) or have questions about your wound care, please contact the [address] [place] [address] at [phone] Monday-Thursday from 8:00 am - 4:30 pm, or Friday from 8:00 am - 2:30 pm.  If you need help with your wound outside these hours and cannot wait until we are again available, contact your home-care company (if applicable), your PCP, or go to the nearest emergency room.

## 2022-08-18 NOTE — PROGRESS NOTES
Rafael Shahid  Progress Note and Procedure Note      Bernabe Amador  AGE: 60 y.o. GENDER: female  : 1965  TODAY'S DATE:  2022    Subjective:     Chief Complaint   Patient presents with    Wound Check         HISTORY of PRESENT ILLNESS HPI     Bernabe Amador is a 60 y.o. female who presents today for wound evaluation. History of Wound: Patient admits to having an ankle fracture in 2019 that has had previous bone infections and has had recurrent ulceration and skin infections stemming from the site since then. She has been treated by multiple different doctors and has been on multiple different antibiotics. Smoker and prediabetic. She had her indium scan since her last visit. They did not do a the SPECT-CT because there was no evidence of uptake in the ankle. She is happy that her wounds remain healed. She has no other complaints at this time.   Wound Pain:  intermittent  Severity:  3 / 10   Wound Type:  diabetic, non-healing/non-surgical, undetermined, refractory osteomyelitis, and lymphedema  Modifying Factors:  edema, venous stasis, lymphedema, diabetes, poor glucose control, shear force, obesity, smoking, arterial insufficiency, decreased tissue oxygenation, and immunosuppression  Associated Signs/Symptoms:  edema, drainage, and pain        PAST MEDICAL HISTORY        Diagnosis Date    COPD (chronic obstructive pulmonary disease) (HCC)     GERD (gastroesophageal reflux disease)     Hiatal hernia    Hyperlipidemia     Primary osteoarthritis of left knee 2017    Restless leg syndrome     Tobacco abuse 2017    Type 2 diabetes mellitus without complication ([facility] [address]     BORDERLINE, DIET       PAST SURGICAL HISTORY    Past Surgical History:   Procedure Laterality Date    JOINT REPLACEMENT      KNEE ARTHROPLASTY Left 05/15/2018    LEFT TOTAL KNEE REPLACEMENT MAKOPLASTY, PLATELET RICH PLASMA WITH ADDUCTOR CANAL BLOCK FOR PAIN CONTROL GUY    KNEE ARTHROSCOPY Left  KNEE ARTHROSCOPY Left 10/30/2017    LEFT KNEE VIDEO ARTHROSCOPY, LATERAL MENISECTOMY, CHONDROPLASTY, INTERNAL FIXATION LATERAL TIBIAL PLATEAU INSUFFICIENCY FRACTURE WITH BONE SUBSTITUTE     KNEE SURGERY      TUBAL LIGATION         FAMILY HISTORY    Family History   Problem Relation Age of Onset    Cancer Father         Prostate    Asthma Brother     Cancer Brother 61        lung cancer    Alcohol Abuse Neg Hx     Allergy (Severe) Neg Hx     Anemia Neg Hx     Arthritis Neg Hx     Arrhythmia Neg Hx     Colon Cancer Neg Hx     Depression Neg Hx        SOCIAL HISTORY    Social History     Tobacco Use    Smoking status: Every Day     Packs/day: 0.50     Years: 5.00     Pack years: 2.50     Types: Cigarettes    Smokeless tobacco: Never    Tobacco comments:      NOT SMOKING UNTIL AFTER SURGERY   Vaping Use    Vaping Use: Never used   Substance Use Topics    Alcohol use: No    Drug use: No       ALLERGIES    Allergies   Allergen Reactions    Aspirin Nausea And Vomiting       MEDICATIONS  Reviewed      REVIEW OF SYSTEMS    Pertinent items are noted in HPI. Objective:      /67   Pulse 71   Temp 98.2 °F (36.8 °C) (Oral)   Resp 18   Ht 5' 4\" (1.626 m)   Wt 192 lb 3.2 oz (87.2 kg)   LMP 04/10/2008   BMI 32.99 kg/m²     PHYSICAL EXAM    Vascular: Vascular status Impaired  palpable pedal pulses, right DP1/4 and PT1/4, left DP1/4 and PT1/4. CFT 3 seconds digits 1 to 5 bilateral.  Hair growthAbsent  both lower extremities and feet. Skin temperature is warm to warm from pretibial area to distal digits bilateral.  Exam is negative for rubor, pallor, cyanosis or signs of acute vascular compromise bilaterally. Exam is positive for edema bilateral lower extremity. Varicosities Present bilateral lower extremity. Neuro: Neurologic status diminished bilateral with epicritic Present , proprioceptive Present, vibratory sensationPresent and protopathic Present. DTRs Present bilateral Achilles.  There were no reproducible neuritic symptoms on exam bilateral feet/ankles. Derm: Ulceration to right lateral ankle at the level of the surgical site incision healed. Ecchymosis Absent  bilateral feet/foot. Musculoskeletal: No pain with debridement. 5/5 muscle strength in/eversion and dorsi/plantarflexion bilateral feet. No gross instability noted. 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 ([address]    Other specified peripheral vascular diseases ([facility] [address] - Primary       Post  Debridement Measurements:  Incision 10/30/17 Knee Left; Anterior (Active)   Number of days: 1134       Wound 07/26/22 # 1 Right Lateral Ankle, Venous, Partial Thickness, Onset 2019 (Active)   Wound Image   07/26/22 0843   Wound Etiology Venous 07/26/22 0843   Wound Cleansed Soap and water 07/26/22 0843   Wound Length (cm) 1.4 cm 07/26/22 0843   Wound Width (cm) 0.3 cm 07/26/22 0843   Wound Depth (cm) 0.1 cm 07/26/22 0843   Wound Surface Area (cm^2) 0.42 cm^2 07/26/22 0843   Wound Volume (cm^3) 0.042 cm^3 07/26/22 0843   Post-Procedure Length (cm) 1.4 cm 07/26/22 0920   Post-Procedure Width (cm) 0.3 cm 07/26/22 0920   Post-Procedure Depth (cm) 0.1 cm 07/26/22 0920   Post-Procedure Surface Area (cm^2) 0.42 cm^2 07/26/22 0920   Post-Procedure Volume (cm^3) 0.042 cm^3 07/26/22 0920   Wound Assessment Pink/red 07/26/22 0843   Drainage Amount Scant 07/26/22 0843   Drainage Description Yellow 07/26/22 0843   Odor None 07/26/22 0843   Nicky-wound Assessment Fragile; Intact; Other (Comment) 07/26/22 0843   Number of days: 0         Plan:   Patient examined and evaluated  Wound currently healed  indium negative for infection.   Keep leg elevated when not active  Discussed appropriate diabetic diet  Discussed smoking cessation  Compression garment ordered  Follow-up as needed, discharge from the wound care center  Recommend figure-of-eight ankle brace instructions dispensed to the patient wear the brace daily all day. The nature of the patient's condition was explained in depth.  The patient was informed that their compliance to the treatment plan is paramount to successful healing and prevention of further ulceration and/or infection     Discharge Treatment leave the compression dressing intact get SPECT-CT    Written Patient Discharge Instructions Given            Electronically signed by Artis Riedel, DPM on 8/18/2022 at 11:18 AM

## 2022-08-19 NOTE — PLAN OF CARE
Compression  [facility] for Compression Stockings:     [address] [facility] [address] [address] f: [phone] f: [phone] p: [phone] [email or web address]      Ordering Center:     [address]  [address] [facility]  [phone]  WOUND CARE Dept: [phone]   FAX NUMBER     Patient Information:      [id number]   [phone]   : 1965  AGE: 60 y.o. GENDER: female   TODAYS DATE:  2022    Insurance:      PRIMARY INSURANCE:  Plan: [employer] [facility]  Coverage: [facility] MEDICAID  Effective Date: 2015  Group Number: [unfilled]  Subscriber Number: [id number] - (Medicaid Managed)    SECONDARY INSURANCE:  Plan:   Coverage:   Effective Date:   [unfilled]    [unfilled]     [unfilled]   [email or web address]@     Patient Information:      Problem List Items Addressed This Visit          Circulatory    Other specified peripheral vascular diseases ([address] - Primary       Endocrine    Diabetes mellitus with skin ulcer ([address]       Other    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 ([facility] [address]       Incision 10/30/17 Knee Left; Anterior (Active)   Number of days: 8177       Right Leg Measurements: (ALL measurements are in cm)  Right Leg Edema Point of Measurement  Great toe to forefoot: 10 cm  Heel to ankle: 9 cm  Heel to calf: 29  Leg circumference: 33 cm  Ankle circumference: 22.5 cm  Foot circumference: 21.5 cm  Compression Therapy: Compression ordered, Tubular elastic support bandage  Tubular Elastic Support Bandage Compression Pressure: Medium    Left Leg Measurements: (ALL measurements are in cm)  Left Leg Edema Point of Measurement  Great toe to forefoot:  (no compression)    Supplies Requested :     Medicare Requirements  Patient must have a qualifying Active Venus Ulcer if ordering Bilateral Compression Wounds MUST be present on both legs for Medicare Coverage. The patient can Not be on home health or have had a Medicare part A stay in the past 24 hours. Patient Wound(s) Debrided: [x]     Debribement Type: Excisional/Sharp    Patient currently being seen by Home Health: [] Yes   [x] No     Compression Type:  Other [employer] Multi-Layer Stocking System  Right Lower Leg Size Short Size B     Provider Information:      PROVIDER'S NAME/NPI: Dr. Kit Loco  NPI [id number]

## 2022-08-23 ENCOUNTER — HOSPITAL ENCOUNTER (OUTPATIENT)
Dept: WOUND CARE | Age: 57
Discharge: HOME | End: 2022-08-23

## 2022-08-23 DIAGNOSIS — E11.622 DIABETES MELLITUS WITH SKIN ULCER (HCC): ICD-10-CM

## 2022-08-23 DIAGNOSIS — I73.89 OTHER SPECIFIED PERIPHERAL VASCULAR DISEASES (HCC): Primary | ICD-10-CM

## 2022-08-23 DIAGNOSIS — L97.314 NON-PRESSURE CHRONIC ULCER OF RIGHT ANKLE WITH NECROSIS OF BONE (HCC): ICD-10-CM

## 2022-08-23 DIAGNOSIS — L98.499 DIABETES MELLITUS WITH SKIN ULCER (HCC): ICD-10-CM

## 2022-10-03 ENCOUNTER — HOSPITAL ENCOUNTER (OUTPATIENT)
Dept: MRI IMAGING | Age: 57
Discharge: HOME | End: 2022-10-03
Payer: MEDICAID

## 2022-10-03 DIAGNOSIS — M54.16 LUMBAR RADICULOPATHY: ICD-10-CM

## 2022-10-03 PROCEDURE — 72148 MRI LUMBAR SPINE W/O DYE: CPT

## 2022-10-06 ENCOUNTER — TELEPHONE (OUTPATIENT)
Dept: WOUND CARE | Age: 57
End: 2022-10-06

## 2022-10-06 NOTE — TELEPHONE ENCOUNTER
Patient called regarding swelling in her feet. States she went to her PCP and the ED for redness and swelling in her legs. States she was diagnosed with cellulitis and was prescribed \"two very strong antibiotics\". Patient unsure of which antibiotics but states she is still currently taking them. Patient last seen in [facility] on 8/19/22. At that time compression stockings were ordered for her from [facility]. Patient states she has never received them. Patient given number for [facility] and told to call them to inquire about the stockings. Patient also advised to continue antibiotics as prescribed and to elevate her legs and much as possible. Patient voiced understanding to all.

## 2023-04-04 ENCOUNTER — APPOINTMENT (OUTPATIENT)
Dept: GENERAL RADIOLOGY | Age: 58
End: 2023-04-04
Payer: MEDICAID

## 2023-04-04 ENCOUNTER — APPOINTMENT (OUTPATIENT)
Dept: VASCULAR LAB | Age: 58
End: 2023-04-04
Payer: MEDICAID

## 2023-04-04 ENCOUNTER — HOSPITAL ENCOUNTER (EMERGENCY)
Age: 58
Discharge: HOME | End: 2023-04-04
Attending: STUDENT IN AN ORGANIZED HEALTH CARE EDUCATION/TRAINING PROGRAM
Payer: MEDICAID

## 2023-04-04 VITALS
TEMPERATURE: 98.7 F | RESPIRATION RATE: 16 BRPM | BODY MASS INDEX: 33.9 KG/M2 | SYSTOLIC BLOOD PRESSURE: 118 MMHG | DIASTOLIC BLOOD PRESSURE: 57 MMHG | HEART RATE: 72 BPM | OXYGEN SATURATION: 98 % | WEIGHT: 198.6 LBS | HEIGHT: 64 IN

## 2023-04-04 DIAGNOSIS — L03.115 CELLULITIS OF RIGHT LEG: Primary | ICD-10-CM

## 2023-04-04 DIAGNOSIS — E87.6 HYPOKALEMIA: ICD-10-CM

## 2023-04-04 LAB
ALBUMIN SERPL-MCNC: 3.9 G/DL (ref 3.4–5)
ALBUMIN/GLOB SERPL: 1.3 {RATIO} (ref 1.1–2.2)
ALP SERPL-CCNC: 86 U/L (ref 40–129)
ALT SERPL-CCNC: 13 U/L (ref 10–40)
ANION GAP SERPL CALCULATED.3IONS-SCNC: 12 MMOL/L (ref 3–16)
AST SERPL-CCNC: 19 U/L (ref 15–37)
BASOPHILS # BLD: 0.1 K/UL (ref 0–0.2)
BASOPHILS NFR BLD: 0.9 %
BILIRUB SERPL-MCNC: <0.2 MG/DL (ref 0–1)
BUN SERPL-MCNC: 15 MG/DL (ref 7–20)
CALCIUM SERPL-MCNC: 9.2 MG/DL (ref 8.3–10.6)
CHLORIDE SERPL-SCNC: 102 MMOL/L (ref 99–110)
CO2 SERPL-SCNC: 26 MMOL/L (ref 21–32)
CREAT SERPL-MCNC: 0.6 MG/DL (ref 0.6–1.1)
DEPRECATED RDW RBC AUTO: 13.2 % (ref 12.4–15.4)
EOSINOPHIL # BLD: 0.2 K/UL (ref 0–0.6)
EOSINOPHIL NFR BLD: 2.8 %
GFR SERPLBLD CREATININE-BSD FMLA CKD-EPI: >60 ML/MIN/{1.73_M2}
GLUCOSE SERPL-MCNC: 109 MG/DL (ref 70–99)
HCT VFR BLD AUTO: 33.5 % (ref 36–48)
HGB BLD-MCNC: 11.6 G/DL (ref 12–16)
LYMPHOCYTES # BLD: 2 K/UL (ref 1–5.1)
LYMPHOCYTES NFR BLD: 27.3 %
MAGNESIUM SERPL-MCNC: 2.1 MG/DL (ref 1.8–2.4)
MCH RBC QN AUTO: 33.1 PG (ref 26–34)
MCHC RBC AUTO-ENTMCNC: 34.6 G/DL (ref 31–36)
MCV RBC AUTO: 95.7 FL (ref 80–100)
MONOCYTES # BLD: 0.7 K/UL (ref 0–1.3)
MONOCYTES NFR BLD: 9.5 %
NEUTROPHILS # BLD: 4.3 K/UL (ref 1.7–7.7)
NEUTROPHILS NFR BLD: 59.5 %
NT-PROBNP SERPL-MCNC: 161 PG/ML (ref 0–124)
PLATELET # BLD AUTO: 270 K/UL (ref 135–450)
PMV BLD AUTO: 7.8 FL (ref 5–10.5)
POTASSIUM SERPL-SCNC: 3.4 MMOL/L (ref 3.5–5.1)
PROT SERPL-MCNC: 6.8 G/DL (ref 6.4–8.2)
RBC # BLD AUTO: 3.5 M/UL (ref 4–5.2)
SODIUM SERPL-SCNC: 140 MMOL/L (ref 136–145)
WBC # BLD AUTO: 7.2 K/UL (ref 4–11)

## 2023-04-04 PROCEDURE — 73590 X-RAY EXAM OF LOWER LEG: CPT

## 2023-04-04 PROCEDURE — 93971 EXTREMITY STUDY: CPT

## 2023-04-04 PROCEDURE — 85025 COMPLETE CBC W/AUTO DIFF WBC: CPT

## 2023-04-04 PROCEDURE — 80053 COMPREHEN METABOLIC PANEL: CPT

## 2023-04-04 PROCEDURE — 6370000000 HC RX 637 (ALT 250 FOR IP): Performed by: PHYSICIAN ASSISTANT

## 2023-04-04 PROCEDURE — 83735 ASSAY OF MAGNESIUM: CPT

## 2023-04-04 PROCEDURE — 83880 ASSAY OF NATRIURETIC PEPTIDE: CPT

## 2023-04-04 RX ORDER — CEPHALEXIN 500 MG/1
500 CAPSULE ORAL ONCE
Status: COMPLETED | OUTPATIENT
Start: 2023-04-04 | End: 2023-04-04

## 2023-04-04 RX ORDER — CEPHALEXIN 500 MG/1
500 CAPSULE ORAL 4 TIMES DAILY
Qty: 28 CAPSULE | Refills: 0 | Status: SHIPPED | OUTPATIENT
Start: 2023-04-04 | End: 2023-04-11

## 2023-04-04 RX ORDER — SULFAMETHOXAZOLE AND TRIMETHOPRIM 800; 160 MG/1; MG/1
1 TABLET ORAL 2 TIMES DAILY
COMMUNITY
Start: 2023-03-31

## 2023-04-04 RX ORDER — POTASSIUM CHLORIDE 20 MEQ/1
40 TABLET, EXTENDED RELEASE ORAL ONCE
Status: COMPLETED | OUTPATIENT
Start: 2023-04-04 | End: 2023-04-04

## 2023-04-04 RX ADMIN — CEPHALEXIN 500 MG: 500 CAPSULE ORAL at 11:37

## 2023-04-04 RX ADMIN — POTASSIUM CHLORIDE 40 MEQ: 1500 TABLET, EXTENDED RELEASE ORAL at 11:37

## 2023-04-04 ASSESSMENT — ENCOUNTER SYMPTOMS
GASTROINTESTINAL NEGATIVE: 1
VOMITING: 0
NAUSEA: 0
RESPIRATORY NEGATIVE: 1

## 2023-04-04 ASSESSMENT — PAIN - FUNCTIONAL ASSESSMENT: PAIN_FUNCTIONAL_ASSESSMENT: NONE - DENIES PAIN

## 2023-04-04 NOTE — DISCHARGE INSTRUCTIONS
Continue with Keflex 4 times daily for the next 7 days. Please follow closely with your PCP. Return to the ED if you develop any new or worsening symptoms.

## 2023-04-04 NOTE — ED TRIAGE NOTES
Chief Complaint   Patient presents with    Leg Pain     Right leg redness pain and swelling, states had surgery in 2019 and has been infected several times since then, on bactrim from pcp but states not improving

## 2023-04-04 NOTE — ED PROVIDER NOTES
considered but not done, Admit vs D/C, Shared Decision Making, Pt Expectation of Test or Tx.):     Plan at this time will be to discharge home with keflex. Patient Told to follow up closely with pcp in 1-2 days. Instructed to return to ED with new or worsening symptoms. Patient discharged in stable condition. I am the Primary Clinician of Record. FINAL IMPRESSION      1. Cellulitis of right leg    2. Hypokalemia          DISPOSITION/PLAN     DISPOSITION Discharge - Pending Orders Complete 04/04/2023 11:37:03 AM      PATIENT REFERRED TO:  Cherry Zuñiga MD  [address] [place] [address] [phone]    Schedule an appointment as soon as possible for a visit in 1 day      [facility] ED  [address]  [phone]  Call   As needed, If symptoms worsen      DISCHARGE MEDICATIONS:  New Prescriptions    CEPHALEXIN (KEFLEX) 500 MG CAPSULE    Take 1 capsule by mouth 4 times daily for 7 days       DISCONTINUED MEDICATIONS:  Discontinued Medications    POTASSIUM CHLORIDE (KLOR-CON M) 10 MEQ EXTENDED RELEASE TABLET    10 mEq daily     TETRACYCLINE (ACHROMYCIN;SUMYCIN) 500 MG CAPSULE    Take 500 mg by mouth in the morning and 500 mg before bedtime.               (Please note that portions of this note were completed with a voice recognition program.  Efforts were made to edit the dictations but occasionally words are mis-transcribed.)    Gabriele Dominguez PA-C (electronically signed)        Gabriele Dominguez PA-C  04/04/23 [id number]

## 2023-04-21 ENCOUNTER — OFFICE VISIT (OUTPATIENT)
Dept: VASCULAR SURGERY | Age: 58
End: 2023-04-21

## 2023-04-21 VITALS
SYSTOLIC BLOOD PRESSURE: 142 MMHG | DIASTOLIC BLOOD PRESSURE: 80 MMHG | BODY MASS INDEX: 34.15 KG/M2 | HEIGHT: 64 IN | WEIGHT: 200 LBS

## 2023-04-21 DIAGNOSIS — M79.89 LEG SWELLING: Primary | ICD-10-CM

## 2023-04-21 NOTE — PROGRESS NOTES
level: Not on file   Occupational History    Not on file   Tobacco Use    Smoking status: Every Day     Packs/day: 0.50     Years: 5.00     Pack years: 2.50     Types: Cigarettes    Smokeless tobacco: Never    Tobacco comments:      NOT SMOKING UNTIL AFTER SURGERY   Vaping Use    Vaping Use: Never used   Substance and Sexual Activity    Alcohol use: No    Drug use: No    Sexual activity: Not Currently   Other Topics Concern    Not on file   Social History Narrative    Not on file     Social Determinants of Health     Financial Resource Strain: Not on file   Food Insecurity: Not on file   Transportation Needs: Not on file   Physical Activity: Not on file   Stress: Not on file   Social Connections: Not on file   Intimate Partner Violence: Not on file   Housing Stability: Not on file       Family History:        Problem Relation Age of Onset    Cancer Father         Prostate    Asthma Brother     Cancer Brother 61        lung cancer    Alcohol Abuse Neg Hx     Allergy (Severe) Neg Hx     Anemia Neg Hx     Arthritis Neg Hx     Arrhythmia Neg Hx     Colon Cancer Neg Hx     Depression Neg Hx        Review of Systems:  A 14 point review of systems was completed. Pertinent positives identified in the HPI, all other review of systems negative. Physical Examination:    BP (!) 142/80 (Site: Right Upper Arm)   Ht 5' 4\" (1.626 m)   Wt 200 lb (90.7 kg)   LMP 04/10/2008   BMI 34.33 kg/m²     Weight: 200 lb (90.7 kg)       General appearance: NAD  Eyes: PERRLA  Neck: no JVD, no lymphadenopathy. Respiratory: effort is unlabored, no crackles, wheezes or rubs. Cardiovascular: regular, no murmur. No carotid bruits. Pulses:    DP   RIGHT 2   LEFT 2   GI: abdomen soft, nondistended, no organomegaly. Musculoskeletal: strength and tone normal.  Extremities: Bilateral pitting edema below knees bilaterally. Erythema anterior RLE  Neuro/psychiatric: grossly intact.       MEDICAL DECISION MAKING/TESTING      Venous Duplex:

## 2023-07-31 ENCOUNTER — OFFICE VISIT (OUTPATIENT)
Dept: ORTHOPEDIC SURGERY | Age: 58
End: 2023-07-31
Payer: MEDICAID

## 2023-07-31 VITALS — BODY MASS INDEX: 34.15 KG/M2 | HEIGHT: 64 IN | WEIGHT: 200 LBS

## 2023-07-31 DIAGNOSIS — M51.36 LUMBAR DEGENERATIVE DISC DISEASE: Primary | ICD-10-CM

## 2023-07-31 PROCEDURE — 99203 OFFICE O/P NEW LOW 30 MIN: CPT | Performed by: ORTHOPAEDIC SURGERY

## 2023-07-31 PROCEDURE — 3017F COLORECTAL CA SCREEN DOC REV: CPT | Performed by: ORTHOPAEDIC SURGERY

## 2023-07-31 PROCEDURE — 4004F PT TOBACCO SCREEN RCVD TLK: CPT | Performed by: ORTHOPAEDIC SURGERY

## 2023-07-31 PROCEDURE — G8417 CALC BMI ABV UP PARAM F/U: HCPCS | Performed by: ORTHOPAEDIC SURGERY

## 2023-07-31 PROCEDURE — G8427 DOCREV CUR MEDS BY ELIG CLIN: HCPCS | Performed by: ORTHOPAEDIC SURGERY

## 2023-07-31 NOTE — PROGRESS NOTES
New Patient: LUMBAR SPINE    Referring Provider:  No ref. provider found    CHIEF COMPLAINT:    Chief Complaint   Patient presents with    Back Pain     NP LUMBAR        HISTORY OF PRESENT ILLNESS:    Ms. Jennifer Carey  is a pleasant 59 y.o. female who presents today for evaluation of low back and bilateral leg pain. Her symptoms began after a fall off of her porch several years ago. She rates her pain 8/10 on medications and 10/10 when out without. She notes numbness tingling and weakness of her legs and difficulty with her balance. She denies saddle anesthesia and bowel or bladder abnormality. Current/Past Treatment:   Physical Therapy: yes  Chiropractic:  no  Injection:  yes   Medications: Norco and Neurontin    Past Medical History:   Past Medical History:   Diagnosis Date    COPD (chronic obstructive pulmonary disease) ([facility])     GERD (gastroesophageal reflux disease)     Hiatal hernia    Hyperlipidemia     Primary osteoarthritis of left knee 9/21/2017    Restless leg syndrome     Tobacco abuse 8/22/2017    Type 2 diabetes mellitus without complication ([facility])     BORDERLINE, DIET      Past Surgical History:     Past Surgical History:   Procedure Laterality Date    JOINT REPLACEMENT      KNEE ARTHROPLASTY Left 05/15/2018    LEFT TOTAL KNEE REPLACEMENT MAKOPLASTY, PLATELET RICH PLASMA WITH ADDUCTOR CANAL BLOCK FOR PAIN CONTROL GUY    KNEE ARTHROSCOPY Left 2013    KNEE ARTHROSCOPY Left 10/30/2017    LEFT KNEE VIDEO ARTHROSCOPY, LATERAL MENISECTOMY, CHONDROPLASTY, INTERNAL FIXATION LATERAL TIBIAL PLATEAU INSUFFICIENCY FRACTURE WITH BONE SUBSTITUTE     KNEE SURGERY      TUBAL LIGATION       Current Medications: Allergies:  Aspirin  Social History:    reports that she has been smoking cigarettes. She has a 2.50 pack-year smoking history. She has never used smokeless tobacco. She reports that she does not drink alcohol and does not use drugs.   Family History:   Family History   Problem Relation Age of

## 2024-06-17 ENCOUNTER — TELEPHONE (OUTPATIENT)
Dept: INFECTIOUS DISEASES | Age: 59
End: 2024-06-17

## 2024-06-18 ENCOUNTER — TELEPHONE (OUTPATIENT)
Dept: INFECTIOUS DISEASES | Age: 59
End: 2024-06-18

## 2024-06-18 NOTE — TELEPHONE ENCOUNTER
Received call from Carol stating patient continuing to call multiple times. I explained note from yesterday.     I tried to place call to patient today and again phone went straight to .  Carol notified.     Ok to schedule patient for NPV. First available is 7/23/24.

## 2024-07-30 ENCOUNTER — OFFICE VISIT (OUTPATIENT)
Dept: INFECTIOUS DISEASES | Age: 59
End: 2024-07-30
Payer: MEDICAID

## 2024-07-30 VITALS
BODY MASS INDEX: 32.95 KG/M2 | OXYGEN SATURATION: 96 % | SYSTOLIC BLOOD PRESSURE: 112 MMHG | TEMPERATURE: 98 F | DIASTOLIC BLOOD PRESSURE: 66 MMHG | WEIGHT: 193 LBS | HEIGHT: 64 IN | HEART RATE: 68 BPM

## 2024-07-30 DIAGNOSIS — L03.90 RECURRENT CELLULITIS: Primary | ICD-10-CM

## 2024-07-30 PROCEDURE — 99204 OFFICE O/P NEW MOD 45 MIN: CPT | Performed by: INTERNAL MEDICINE

## 2024-07-30 PROCEDURE — 3017F COLORECTAL CA SCREEN DOC REV: CPT | Performed by: INTERNAL MEDICINE

## 2024-07-30 PROCEDURE — G8417 CALC BMI ABV UP PARAM F/U: HCPCS | Performed by: INTERNAL MEDICINE

## 2024-07-30 PROCEDURE — 4004F PT TOBACCO SCREEN RCVD TLK: CPT | Performed by: INTERNAL MEDICINE

## 2024-07-30 PROCEDURE — G8427 DOCREV CUR MEDS BY ELIG CLIN: HCPCS | Performed by: INTERNAL MEDICINE

## 2024-07-30 NOTE — PROGRESS NOTES
Infectious Diseases   Consult Note      Reason for Consult:  LE cellulitis    Requesting Physician:   Kamron Avila DPM         Subjective:   CHIEF COMPLAINT:  none given       HPI:     Deb Ellis is a 58yoF with history of MS, DM, HTN    She is referred for evaluation of recurrent LE cellulitis               History of diet-controlled diabetes, anxiety, RLS, chronic back pain.     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     The leg is chronically swollen.    She is referred today for management of rec cellulitis   Episodes typically manifest as swelling, \"small bumps,\" some erythema.  Some fever.    Treatment for these episodes has been Bactrim  She thinks maybe 4 episodes this year.  Last episode was maybe March       Current abx:  None        Past Surgical History:       Diagnosis Date    COPD (chronic obstructive pulmonary disease) ([facility])     GERD (gastroesophageal reflux disease)     Hiatal hernia    Hyperlipidemia     Primary osteoarthritis of left knee 9/21/2017    Restless leg syndrome     Tobacco abuse 8/22/2017    Type 2 diabetes mellitus without complication ([facility])     BORDERLINE, DIET         Procedure Laterality Date    JOINT REPLACEMENT      KNEE ARTHROPLASTY Left 05/15/2018    LEFT TOTAL KNEE REPLACEMENT MAKOPLASTY, PLATELET RICH PLASMA WITH ADDUCTOR CANAL BLOCK FOR PAIN CONTROL GUY    KNEE ARTHROSCOPY Left 2013    KNEE ARTHROSCOPY Left 10/30/2017    LEFT KNEE VIDEO ARTHROSCOPY, LATERAL MENISECTOMY, CHONDROPLASTY, INTERNAL FIXATION LATERAL TIBIAL PLATEAU

## 2024-08-07 ENCOUNTER — TELEPHONE (OUTPATIENT)
Dept: INFECTIOUS DISEASES | Age: 59
End: 2024-08-07

## 2024-08-07 NOTE — TELEPHONE ENCOUNTER
Received vm from patient.   Patient states that she has an infection going up her leg.    I attempted to call back for more info but call went straight to  and mailbox is full, so unable to leave message.

## 2024-08-08 ENCOUNTER — TELEPHONE (OUTPATIENT)
Dept: INFECTIOUS DISEASES | Age: 59
End: 2024-08-08

## 2024-08-08 NOTE — TELEPHONE ENCOUNTER
Called listed number  No answer   Cannot leave a VM   Will anticipate she will call back if concern persists

## 2024-08-08 NOTE — TELEPHONE ENCOUNTER
Received a message stating patient is calling again.     I attempted to call patient and phone goes straight to  and mailbox full.